# Patient Record
Sex: MALE | Race: WHITE | Employment: OTHER | URBAN - NONMETROPOLITAN AREA
[De-identification: names, ages, dates, MRNs, and addresses within clinical notes are randomized per-mention and may not be internally consistent; named-entity substitution may affect disease eponyms.]

---

## 2017-04-11 ENCOUNTER — OFFICE VISIT (OUTPATIENT)
Dept: CARDIOLOGY | Age: 67
End: 2017-04-11

## 2017-04-11 VITALS
BODY MASS INDEX: 28.39 KG/M2 | HEIGHT: 71 IN | DIASTOLIC BLOOD PRESSURE: 98 MMHG | WEIGHT: 202.8 LBS | HEART RATE: 44 BPM | SYSTOLIC BLOOD PRESSURE: 176 MMHG

## 2017-04-11 DIAGNOSIS — I10 ESSENTIAL HYPERTENSION: Primary | ICD-10-CM

## 2017-04-11 DIAGNOSIS — I48.0 PAROXYSMAL ATRIAL FIBRILLATION (HCC): ICD-10-CM

## 2017-04-11 DIAGNOSIS — E78.01 FAMILIAL HYPERCHOLESTEROLEMIA: ICD-10-CM

## 2017-04-11 PROCEDURE — 1036F TOBACCO NON-USER: CPT | Performed by: NUCLEAR MEDICINE

## 2017-04-11 PROCEDURE — 3017F COLORECTAL CA SCREEN DOC REV: CPT | Performed by: NUCLEAR MEDICINE

## 2017-04-11 PROCEDURE — G8427 DOCREV CUR MEDS BY ELIG CLIN: HCPCS | Performed by: NUCLEAR MEDICINE

## 2017-04-11 PROCEDURE — G8420 CALC BMI NORM PARAMETERS: HCPCS | Performed by: NUCLEAR MEDICINE

## 2017-04-11 PROCEDURE — 4040F PNEUMOC VAC/ADMIN/RCVD: CPT | Performed by: NUCLEAR MEDICINE

## 2017-04-11 PROCEDURE — 99213 OFFICE O/P EST LOW 20 MIN: CPT | Performed by: NUCLEAR MEDICINE

## 2017-04-11 PROCEDURE — 1123F ACP DISCUSS/DSCN MKR DOCD: CPT | Performed by: NUCLEAR MEDICINE

## 2017-04-11 RX ORDER — FLECAINIDE ACETATE 100 MG/1
TABLET ORAL
Qty: 135 TABLET | Refills: 4 | Status: SHIPPED | OUTPATIENT
Start: 2017-04-11 | End: 2018-07-02 | Stop reason: SDUPTHER

## 2017-04-11 RX ORDER — VALSARTAN AND HYDROCHLOROTHIAZIDE 320; 25 MG/1; MG/1
1 TABLET, FILM COATED ORAL DAILY
Qty: 90 TABLET | Refills: 4 | Status: SHIPPED | OUTPATIENT
Start: 2017-04-11 | End: 2021-01-05 | Stop reason: SDUPTHER

## 2017-12-20 RX ORDER — FLECAINIDE ACETATE 100 MG/1
TABLET ORAL
Qty: 135 TABLET | Refills: 2 | Status: SHIPPED | OUTPATIENT
Start: 2017-12-20 | End: 2018-02-13 | Stop reason: SDUPTHER

## 2018-02-13 ENCOUNTER — OFFICE VISIT (OUTPATIENT)
Dept: CARDIOLOGY CLINIC | Age: 68
End: 2018-02-13
Payer: MEDICARE

## 2018-02-13 VITALS
SYSTOLIC BLOOD PRESSURE: 146 MMHG | WEIGHT: 211 LBS | HEIGHT: 71 IN | DIASTOLIC BLOOD PRESSURE: 80 MMHG | BODY MASS INDEX: 29.54 KG/M2 | HEART RATE: 47 BPM

## 2018-02-13 DIAGNOSIS — R07.9 CHEST PAIN, UNSPECIFIED TYPE: Primary | ICD-10-CM

## 2018-02-13 DIAGNOSIS — I48.0 PAROXYSMAL ATRIAL FIBRILLATION (HCC): ICD-10-CM

## 2018-02-13 DIAGNOSIS — E78.01 FAMILIAL HYPERCHOLESTEROLEMIA: ICD-10-CM

## 2018-02-13 DIAGNOSIS — I10 ESSENTIAL HYPERTENSION: ICD-10-CM

## 2018-02-13 PROCEDURE — 99214 OFFICE O/P EST MOD 30 MIN: CPT | Performed by: NUCLEAR MEDICINE

## 2018-02-13 PROCEDURE — 93000 ELECTROCARDIOGRAM COMPLETE: CPT | Performed by: NUCLEAR MEDICINE

## 2018-02-13 NOTE — PROGRESS NOTES
Pt here for check up   Pt states he has been having some chest pain for the last 3 weeks   Gets it more when he bends over , does not get the chest pain on exertion   Also has some SOB when bending over   Denies dizziness

## 2018-02-13 NOTE — PROGRESS NOTES
[facility]  [address] [facility] [address]  Dept: [phone]  Dept Fax: [phone]  Loc: [phone]    Visit Date: 2/13/2018    Keegan Rogers is a 79 y.o. male who presents today for:  Chief Complaint   Patient presents with    Check-Up    Chest Pain    Hypertension    Atrial Fibrillation   having chest pain   In the middle of the chest   Worse with movement  Not exertional in nature  More positional than exertional   Some dyspnea   No dizziness  Known A fib that has been stable  BP is stable   No ER visits  No known CAD      HPI:  HPI  Past Medical History:   Diagnosis Date    Cancer ([address]     skin    GERD (gastroesophageal reflux disease)     Hyperlipidemia     Hypertension     Unspecified sleep apnea       Past Surgical History:   Procedure Laterality Date    ATRIAL ABLATION SURGERY  2000    unsure of physician    CHOLECYSTECTOMY  5-30-12    Dr. Arely Scales  2010     Dr. Hanna Richards  2007    Dr. Javon Frank  2010    Dr. John Kennedy     Family History   Problem Relation Age of Onset    Dementia Mother     High Blood Pressure Father     Heart Failure Father      Social History   Substance Use Topics    Smoking status: Never Smoker    Smokeless tobacco: Never Used    Alcohol use Yes      Comment: 2 glasses of wine per night until getting sick       Current Outpatient Prescriptions   Medication Sig Dispense Refill    aspirin 81 MG tablet Take 81 mg by mouth daily      Calcium Carb-Cholecalciferol (CALCIUM 1000 + D PO) Take by mouth      Fexofenadine HCl (MUCINEX ALLERGY PO) Take by mouth      flecainide (TAMBOCOR) 100 MG tablet Take 100mg in AM and 50in  tablet 4    rivaroxaban (XARELTO) 20 MG TABS tablet Take 1 tablet by mouth every 24 hours 90 tablet 4    valsartan-hydrochlorothiazide (DIOVAN HCT) 320-25 MG per tablet Take 1 tablet by mouth daily 90 tablet 4    Atorvastatin Calcium (LIPITOR PO) Take 20 mg by mouth daily.  FENOFIBRATE PO Take 54 mg by mouth daily.  atenolol (TENORMIN) 50 MG tablet Take 50 mg by mouth 2 times daily       guaiFENesin (MUCINEX) 600 MG SR tablet Take 600 mg by mouth every other day       Methylcellulose, Laxative, (CITRUCEL) 500 MG TABS Take 500 mg by mouth daily.  montelukast (SINGULAIR) 10 MG tablet Take 10 mg by mouth nightly.  therapeutic multivitamin-minerals (THERAGRAN-M) tablet Take 1 tablet by mouth daily.  omeprazole (PRILOSEC) 20 MG capsule Take 20 mg by mouth every other day        No current facility-administered medications for this visit. Allergies   Allergen Reactions    Celecoxib Hives    Pcn [Penicillins] Other (See Comments)     unsure    Sulfa Antibiotics Other (See Comments)     unsure     Health Maintenance   Topic Date Due    Hepatitis C screen  1950    DTaP/Tdap/Td vaccine (1 - Tdap) 10/14/1969    Diabetes screen  10/14/1990    Colon cancer screen colonoscopy  10/14/2000    Zostavax vaccine  10/14/2010    Potassium monitoring  07/02/2013    Creatinine monitoring  07/02/2013    Pneumococcal low/med risk (1 of 2 - PCV13) 10/14/2015    Flu vaccine (1) 09/01/2017    Lipid screen  11/12/2020       Subjective:  Review of Systems  General:   No fever, no chills, some fatigue or weight loss  Pulmonary:    some dyspnea, no wheezing  Cardiac:    Did have recent chest pain,   GI:     No nausea or vomiting, no abdominal pain  Neuro:     No dizziness or light headedness,   Musculoskeletal:  No recent active issues  Extremities:   No edema, good peripheral pulses      Objective:  Physical Exam  BP (!) 166/72   Pulse (!) 47   Ht 5' 11\" (1.803 m)   Wt 211 lb (95.7 kg)   BMI 29.43 kg/m²   General:   Well developed, well nourished  Lungs:    Clear to auscultation  Heart:    Normal S1 S2, Slight murmur.  no rubs, no gallops  Abdomen:   Soft, non tender, no organomegalies, positive bowel sounds  Extremities:   No edema, no cyanosis, good peripheral pulses  Neurological:   Awake, alert, oriented. No obvious focal deficits  Musculoskelatal:  No obvious deformities    Assessment:     1. Chest pain, unspecified type  EKG 12 Lead   2. Essential hypertension     3. Paroxysmal atrial fibrillation (HCC)     4. Familial hypercholesterolemia     chest pain  higher risk patient  Atypical chest pain for most part  Cant exclude angina but less likely   ECG in office was done today. I reviewed the ECG. No acute findings      Plan:  No Follow-up on file. Discussed options   Consider a stress test and echo   Less likely cardiac related   Mostly muscular in nature   Will watch and treat with NSAIDS   Need better BP control  Consider a CT of the chest   Continue risk factor modification and medical management  Thank you for allowing me to participate in the care of your patient. Please don't hesitate to contact me regarding any further issues related to the patient care    Orders Placed:  Orders Placed This Encounter   Procedures    EKG 12 Lead     Order Specific Question:   Reason for Exam?     Answer: Other       Medications Prescribed:  No orders of the defined types were placed in this encounter. Discussed use, benefit, and side effects of prescribed medications. All patient questions answered. Pt voiced understanding. Instructed to continue current medications, diet and exercise. Continue risk factor modification and medical management. Patient agreed with treatment plan. Follow up as directed.     Electronically signed by Daquan Paiz MD on 2/13/2018 at 3:32 PM

## 2018-04-17 ENCOUNTER — TELEPHONE (OUTPATIENT)
Dept: CARDIOLOGY CLINIC | Age: 68
End: 2018-04-17

## 2018-04-17 ENCOUNTER — OFFICE VISIT (OUTPATIENT)
Dept: CARDIOLOGY CLINIC | Age: 68
End: 2018-04-17
Payer: MEDICARE

## 2018-04-17 VITALS
SYSTOLIC BLOOD PRESSURE: 144 MMHG | HEIGHT: 71 IN | WEIGHT: 205 LBS | DIASTOLIC BLOOD PRESSURE: 82 MMHG | BODY MASS INDEX: 28.7 KG/M2 | HEART RATE: 42 BPM

## 2018-04-17 DIAGNOSIS — I48.20 CHRONIC ATRIAL FIBRILLATION (HCC): Primary | ICD-10-CM

## 2018-04-17 DIAGNOSIS — I10 ESSENTIAL HYPERTENSION: ICD-10-CM

## 2018-04-17 DIAGNOSIS — E78.01 FAMILIAL HYPERCHOLESTEROLEMIA: ICD-10-CM

## 2018-04-17 PROCEDURE — G8427 DOCREV CUR MEDS BY ELIG CLIN: HCPCS | Performed by: NUCLEAR MEDICINE

## 2018-04-17 PROCEDURE — 1123F ACP DISCUSS/DSCN MKR DOCD: CPT | Performed by: NUCLEAR MEDICINE

## 2018-04-17 PROCEDURE — G8419 CALC BMI OUT NRM PARAM NOF/U: HCPCS | Performed by: NUCLEAR MEDICINE

## 2018-04-17 PROCEDURE — 99213 OFFICE O/P EST LOW 20 MIN: CPT | Performed by: NUCLEAR MEDICINE

## 2018-04-17 PROCEDURE — 4040F PNEUMOC VAC/ADMIN/RCVD: CPT | Performed by: NUCLEAR MEDICINE

## 2018-04-17 PROCEDURE — 1036F TOBACCO NON-USER: CPT | Performed by: NUCLEAR MEDICINE

## 2018-04-17 PROCEDURE — 3017F COLORECTAL CA SCREEN DOC REV: CPT | Performed by: NUCLEAR MEDICINE

## 2018-07-02 RX ORDER — FLECAINIDE ACETATE 100 MG/1
TABLET ORAL
Qty: 135 TABLET | Refills: 4 | Status: SHIPPED | OUTPATIENT
Start: 2018-07-02 | End: 2018-11-27 | Stop reason: SDUPTHER

## 2018-11-27 ENCOUNTER — OFFICE VISIT (OUTPATIENT)
Dept: CARDIOLOGY CLINIC | Age: 68
End: 2018-11-27
Payer: MEDICARE

## 2018-11-27 VITALS
WEIGHT: 203 LBS | HEIGHT: 71 IN | DIASTOLIC BLOOD PRESSURE: 80 MMHG | HEART RATE: 62 BPM | BODY MASS INDEX: 28.42 KG/M2 | SYSTOLIC BLOOD PRESSURE: 160 MMHG

## 2018-11-27 DIAGNOSIS — I48.0 PAROXYSMAL ATRIAL FIBRILLATION (HCC): Primary | ICD-10-CM

## 2018-11-27 DIAGNOSIS — I10 ESSENTIAL HYPERTENSION: ICD-10-CM

## 2018-11-27 DIAGNOSIS — E78.01 FAMILIAL HYPERCHOLESTEROLEMIA: ICD-10-CM

## 2018-11-27 PROCEDURE — 1123F ACP DISCUSS/DSCN MKR DOCD: CPT | Performed by: NUCLEAR MEDICINE

## 2018-11-27 PROCEDURE — 3017F COLORECTAL CA SCREEN DOC REV: CPT | Performed by: NUCLEAR MEDICINE

## 2018-11-27 PROCEDURE — G8427 DOCREV CUR MEDS BY ELIG CLIN: HCPCS | Performed by: NUCLEAR MEDICINE

## 2018-11-27 PROCEDURE — 1101F PT FALLS ASSESS-DOCD LE1/YR: CPT | Performed by: NUCLEAR MEDICINE

## 2018-11-27 PROCEDURE — 99213 OFFICE O/P EST LOW 20 MIN: CPT | Performed by: NUCLEAR MEDICINE

## 2018-11-27 PROCEDURE — G8419 CALC BMI OUT NRM PARAM NOF/U: HCPCS | Performed by: NUCLEAR MEDICINE

## 2018-11-27 PROCEDURE — 4040F PNEUMOC VAC/ADMIN/RCVD: CPT | Performed by: NUCLEAR MEDICINE

## 2018-11-27 PROCEDURE — G8484 FLU IMMUNIZE NO ADMIN: HCPCS | Performed by: NUCLEAR MEDICINE

## 2018-11-27 PROCEDURE — 1036F TOBACCO NON-USER: CPT | Performed by: NUCLEAR MEDICINE

## 2018-11-27 RX ORDER — FLECAINIDE ACETATE 100 MG/1
TABLET ORAL
Qty: 135 TABLET | Refills: 3 | Status: SHIPPED | OUTPATIENT
Start: 2018-11-27 | End: 2019-11-11 | Stop reason: SDUPTHER

## 2018-11-27 NOTE — PROGRESS NOTES
by mouth daily.  atenolol (TENORMIN) 50 MG tablet Take 50 mg by mouth 2 times daily       guaiFENesin (MUCINEX) 600 MG SR tablet Take 600 mg by mouth every other day       Methylcellulose, Laxative, (CITRUCEL) 500 MG TABS Take 500 mg by mouth daily.  montelukast (SINGULAIR) 10 MG tablet Take 10 mg by mouth nightly.  therapeutic multivitamin-minerals (THERAGRAN-M) tablet Take 1 tablet by mouth daily.  omeprazole (PRILOSEC) 20 MG capsule Take 20 mg by mouth every other day        No current facility-administered medications for this visit. Allergies   Allergen Reactions    Celecoxib Hives    Pcn [Penicillins] Other (See Comments)     unsure    Sulfa Antibiotics Other (See Comments)     unsure     Health Maintenance   Topic Date Due    Hepatitis C screen  1950    DTaP/Tdap/Td vaccine (1 - Tdap) 10/14/1969    Diabetes screen  10/14/1990    Shingles Vaccine (1 of 2 - 2 Dose Series) 10/14/2000    Colon cancer screen colonoscopy  10/14/2000    Pneumococcal low/med risk (1 of 2 - PCV13) 10/14/2015    Flu vaccine (1) 09/01/2018    Lipid screen  11/12/2020       Subjective:  Review of Systems  General:   No fever, no chills, No fatigue or weight loss  Pulmonary:    No dyspnea, no wheezing  Cardiac:    Denies recent chest pain,   GI:     No nausea or vomiting, no abdominal pain  Neuro:    No dizziness or light headedness,   Musculoskeletal:  No recent active issues  Extremities:   No edema, good peripheral pulses      Objective:  Physical Exam  BP (!) 150/80   Pulse 62   Ht 5' 11\" (1.803 m)   Wt 203 lb (92.1 kg)   BMI 28.31 kg/m²   General:   Well developed, well nourished  Lungs:   Clear to auscultation  Heart:    Normal S1 S2, Slight murmur. no rubs, no gallops  Abdomen:   Soft, non tender, no organomegalies, positive bowel sounds  Extremities:   No edema, no cyanosis, good peripheral pulses  Neurological:   Awake, alert, oriented.  No obvious focal

## 2019-04-04 ENCOUNTER — HOSPITAL ENCOUNTER (OUTPATIENT)
Dept: CT IMAGING | Age: 69
Discharge: HOME | End: 2019-04-04
Payer: MEDICARE

## 2019-04-04 ENCOUNTER — TELEPHONE (OUTPATIENT)
Dept: CARDIOTHORACIC SURGERY | Age: 69
End: 2019-04-04

## 2019-04-04 ENCOUNTER — TELEPHONE (OUTPATIENT)
Dept: CARDIOLOGY CLINIC | Age: 69
End: 2019-04-04

## 2019-04-04 DIAGNOSIS — I71.40 ABDOMINAL AORTIC ANEURYSM (AAA) WITHOUT RUPTURE: Primary | ICD-10-CM

## 2019-04-04 DIAGNOSIS — Z00.6 EXAMINATION FOR NORMAL COMPARISON FOR CLINICAL RESEARCH: ICD-10-CM

## 2019-04-04 DIAGNOSIS — I71.40 ABDOMINAL AORTIC ANEURYSM (AAA) WITHOUT RUPTURE: ICD-10-CM

## 2019-04-04 DIAGNOSIS — I67.1 SACCULAR ANEURYSM: ICD-10-CM

## 2019-04-04 LAB — POC CREATININE WHOLE BLOOD: 1.2 MG/DL (ref 0.5–1.2)

## 2019-04-04 PROCEDURE — 6360000004 HC RX CONTRAST MEDICATION: Performed by: THORACIC SURGERY (CARDIOTHORACIC VASCULAR SURGERY)

## 2019-04-04 PROCEDURE — 82565 ASSAY OF CREATININE: CPT

## 2019-04-04 PROCEDURE — 75635 CT ANGIO ABDOMINAL ARTERIES: CPT

## 2019-04-04 PROCEDURE — 3209999900 CT COMPARISON OF OUTSIDE FILMS

## 2019-04-04 RX ADMIN — IOPAMIDOL 110 ML: 755 INJECTION, SOLUTION INTRAVENOUS at 13:07

## 2019-04-04 NOTE — TELEPHONE ENCOUNTER
Pre op Risk Assessment    Procedure Colonoscopy  Physician Dr. Rekha Flores  Date of surgery/procedure TBD    Last OV 11/27/18  Last Stress Not in Epic  Last Echo Not in Epic  Last Cath Not in Epic  Last Stent Not in Epic  Is patient on blood thinners Xarelto  Hold Meds/how many days 1 day

## 2019-04-04 NOTE — TELEPHONE ENCOUNTER
Pt called into office stating he was seen in the ER in [facility] last night and he needs an appt with Dr. Callie Clancy. Spoke to  about this pt this morning and was expecting his call. States he can see the patient in office tomorrow at 1:30 or 2:00. Pt instructed and will be here tomorrow for new patient visit.

## 2019-04-05 ENCOUNTER — OFFICE VISIT (OUTPATIENT)
Dept: CARDIOTHORACIC SURGERY | Age: 69
End: 2019-04-05
Payer: MEDICARE

## 2019-04-05 VITALS
SYSTOLIC BLOOD PRESSURE: 100 MMHG | DIASTOLIC BLOOD PRESSURE: 67 MMHG | HEART RATE: 50 BPM | BODY MASS INDEX: 28.36 KG/M2 | WEIGHT: 202.6 LBS | HEIGHT: 71 IN

## 2019-04-05 DIAGNOSIS — I71.40 ABDOMINAL AORTIC ANEURYSM (AAA) 3.0 CM TO 5.5 CM IN DIAMETER IN MALE: Primary | ICD-10-CM

## 2019-04-05 PROCEDURE — 99204 OFFICE O/P NEW MOD 45 MIN: CPT | Performed by: THORACIC SURGERY (CARDIOTHORACIC VASCULAR SURGERY)

## 2019-04-05 PROCEDURE — G8427 DOCREV CUR MEDS BY ELIG CLIN: HCPCS | Performed by: THORACIC SURGERY (CARDIOTHORACIC VASCULAR SURGERY)

## 2019-04-05 PROCEDURE — 4040F PNEUMOC VAC/ADMIN/RCVD: CPT | Performed by: THORACIC SURGERY (CARDIOTHORACIC VASCULAR SURGERY)

## 2019-04-05 PROCEDURE — 3017F COLORECTAL CA SCREEN DOC REV: CPT | Performed by: THORACIC SURGERY (CARDIOTHORACIC VASCULAR SURGERY)

## 2019-04-05 PROCEDURE — G8419 CALC BMI OUT NRM PARAM NOF/U: HCPCS | Performed by: THORACIC SURGERY (CARDIOTHORACIC VASCULAR SURGERY)

## 2019-04-05 PROCEDURE — 1036F TOBACCO NON-USER: CPT | Performed by: THORACIC SURGERY (CARDIOTHORACIC VASCULAR SURGERY)

## 2019-04-05 PROCEDURE — 1123F ACP DISCUSS/DSCN MKR DOCD: CPT | Performed by: THORACIC SURGERY (CARDIOTHORACIC VASCULAR SURGERY)

## 2019-04-05 RX ORDER — CYCLOBENZAPRINE HCL 10 MG
10 TABLET ORAL 3 TIMES DAILY PRN
COMMUNITY
End: 2019-11-26

## 2019-04-05 ASSESSMENT — ENCOUNTER SYMPTOMS
ALLERGIC/IMMUNOLOGIC NEGATIVE: 1
RESPIRATORY NEGATIVE: 1
EYES NEGATIVE: 1
GASTROINTESTINAL NEGATIVE: 1

## 2019-04-05 NOTE — PROGRESS NOTES
[address] [place] [address]  Dept: [phone]  Dept Fax: [phone]: [phone]    Visit Date: 4/5/2019    Mr. Jinny Rodriguez is a 76 y.o.male  who presented for:  Chief Complaint   Patient presents with    New Patient     ER referral, Saccular aneurysm, AAA        HPI:   HPI : 77 y/o male hx low back pain went to ER CTA abdomen pelvis done found 3 cm widest diameter infra renal saccular AAA. CTA runoff feet done yesterday, no popliteal or visceral aneurysms, there is a right posterior sacculation of the mid infrarenal aorta that measures 3.1 cm from its edge to the opposite aortic wall. Native aorta 2.6 cm AP by renals. Wide open arteries bilaterally with 3 vessel below knee runoff both legs and palpable DP and PT pulses bilaterally. Has small bilateral inguinal hernias, fat ,2 liver cysts, cholecystectomy and colonic diverticulosis, I informed Mr. Jinny Rodriguez about these too. Since aneurysm saccular will repeat CT Abd Pelvis without contrast in 1 year. Mr Jinny Rodriguez and wife agreeable. Current Outpatient Medications:     cyclobenzaprine (FLEXERIL) 10 MG tablet, Take 10 mg by mouth 3 times daily as needed for Muscle spasms, Disp: , Rfl:     flecainide (TAMBOCOR) 100 MG tablet, Take 100mg in AM and 50in PM, Disp: 135 tablet, Rfl: 3    rivaroxaban (XARELTO) 20 MG TABS tablet, Take 1 tablet by mouth every 24 hours, Disp: 90 tablet, Rfl: 3    aspirin 81 MG tablet, Take 81 mg by mouth daily, Disp: , Rfl:     Calcium Carb-Cholecalciferol (CALCIUM 1000 + D PO), Take by mouth, Disp: , Rfl:     Fexofenadine HCl (MUCINEX ALLERGY PO), Take by mouth, Disp: , Rfl:     valsartan-hydrochlorothiazide (DIOVAN HCT) 320-25 MG per tablet, Take 1 tablet by mouth daily, Disp: 90 tablet, Rfl: 4    Atorvastatin Calcium (LIPITOR PO), Take 20 mg by mouth daily. , Disp: , Rfl:     FENOFIBRATE PO, Take 54 mg by mouth daily. , Disp: , Rfl:     atenolol (TENORMIN) 50 MG tablet, Take 50 mg by mouth 2 times daily , Disp: , Rfl:     guaiFENesin (MUCINEX) 600 MG SR tablet, Take 600 mg by mouth every other day , Disp: , Rfl:     Methylcellulose, Laxative, (CITRUCEL) 500 MG TABS, Take 500 mg by mouth daily. , Disp: , Rfl:     montelukast (SINGULAIR) 10 MG tablet, Take 10 mg by mouth nightly.  , Disp: , Rfl:     therapeutic multivitamin-minerals (THERAGRAN-M) tablet, Take 1 tablet by mouth daily. , Disp: , Rfl:     omeprazole (PRILOSEC) 20 MG capsule, Take 20 mg by mouth every other day , Disp: , Rfl:     Allergies   Allergen Reactions    Celecoxib Hives    Pcn [Penicillins] Other (See Comments)     unsure    Sulfa Antibiotics Other (See Comments)     unsure       Past Medical History  Niranjan Sevilla  has a past medical history of Cancer ([facility] [address] GERD (gastroesophageal reflux disease), Hyperlipidemia, Hypertension, and Unspecified sleep apnea. Social History  Niranjan Sevilla  reports that he has never smoked. He has never used smokeless tobacco. He reports that he drinks alcohol. He reports that he does not use drugs. Family History  Zach family history includes Dementia in his mother; Heart Failure in his father; High Blood Pressure in his father. There is no family history of bicuspid aortic valve, aneurysms, heart transplant, pacemakers, defibrillators, or sudden cardiac death. Past Surgical History   Past Surgical History:   Procedure Laterality Date    ATRIAL ABLATION SURGERY  2000    unsure of physician    CHOLECYSTECTOMY  5-30-12    Dr. Nicholas Pemberton  2010     Dr. Roshan Monteiro  2007    Dr. Jonathan Baker  2010    Dr. Lalitha Lama       Subjective:     Review of Systems   Constitutional: Negative. HENT: Negative. Eyes: Negative. Respiratory: Negative. Cardiovascular: Negative. Gastrointestinal: Negative. Endocrine: Negative. Genitourinary: Negative. Musculoskeletal: Negative. Skin: Negative. Allergic/Immunologic: Negative. Neurological: Negative. Hematological: Negative. Psychiatric/Behavioral: Negative. Objective:     /67 (Site: Left Upper Arm, Position: Sitting, Cuff Size: Medium Adult)   Pulse 50   Ht 5' 11\" (1.803 m)   Wt 202 lb 9.6 oz (91.9 kg)   BMI 28.26 kg/m²     Wt Readings from Last 3 Encounters:   04/05/19 202 lb 9.6 oz (91.9 kg)   11/27/18 203 lb (92.1 kg)   04/17/18 205 lb (93 kg)     BP Readings from Last 3 Encounters:   04/05/19 100/67   11/27/18 (!) 160/80   04/17/18 (!) 144/82       Physical Exam   Constitutional: He is oriented to person, place, and time. He appears well-developed and well-nourished. No distress. HENT:   Head: Normocephalic and atraumatic. Right Ear: External ear normal.   Left Ear: External ear normal.   Nose: Nose normal.   Mouth/Throat: Oropharynx is clear and moist. No oropharyngeal exudate. Eyes: Pupils are equal, round, and reactive to light. Conjunctivae and EOM are normal. Right eye exhibits no discharge. Left eye exhibits no discharge. No scleral icterus. Neck: Normal range of motion. Neck supple. No JVD present. No tracheal deviation present. No thyromegaly present. Cardiovascular: Normal rate, regular rhythm and normal heart sounds. Exam reveals no gallop and no friction rub. No murmur heard. Pulmonary/Chest: Effort normal and breath sounds normal. No stridor. No respiratory distress. He has no wheezes. He has no rales. He exhibits no tenderness. Abdominal: Soft. Bowel sounds are normal. He exhibits no distension and no mass. There is no tenderness. There is no rebound and no guarding. Musculoskeletal: Normal range of motion. He exhibits no edema, tenderness or deformity. Lymphadenopathy:     He has no cervical adenopathy. Neurological: He is alert and oriented to person, place, and time. He has normal reflexes. He displays normal reflexes.  No cranial nerve deficit or sensory deficit. He exhibits normal muscle tone. Coordination normal.   Skin: Skin is warm and dry. Capillary refill takes less than 2 seconds. No rash noted. He is not diaphoretic. No erythema. No pallor. Psychiatric: He has a normal mood and affect. His behavior is normal. Judgment and thought content normal.       Lab Results   Component Value Date    WBC 4.6 09/05/2012    RBC 4.67 09/05/2012    RBC 4.68 06/06/2012    HGB 15.2 09/05/2012    HCT 43.0 09/05/2012    MCV 92.2 09/05/2012    MCH 32.5 09/05/2012    MCHC 35.3 09/05/2012    RDW 14.7 09/05/2012     09/05/2012    MPV 9.1 09/05/2012       Lab Results   Component Value Date     07/02/2012    K 4.8 07/02/2012     07/02/2012    CO2 28 07/02/2012    BUN 20 07/02/2012    LABALBU 3.7 09/05/2012    LABALBU 3.8 06/06/2012    CREATININE 1.1 07/02/2012    CALCIUM 9.1 07/02/2012    LABGLOM 68 07/02/2012    GLUCOSE 105 07/02/2012    GLUCOSE 118 06/06/2012       Lab Results   Component Value Date    ALKPHOS 49 09/05/2012    ALT 27 09/05/2012    AST 24 09/05/2012    PROT 7.0 09/05/2012    BILITOT 1.0 09/05/2012    BILIDIR 0.3 09/05/2012    LABALBU 3.7 09/05/2012    LABALBU 3.8 06/06/2012       Lab Results   Component Value Date    MG 2.1 05/31/2012       Lab Results   Component Value Date    INR 1.16 (H) 05/26/2012         No results found for: LABA1C    No results found for: TRIG, HDL, LDLCALC, LDLDIRECT, LABVLDL    No results found for: TSH      Testing Reviewed:      I have individually reviewed the below cardiac tests    EKG:     ECHO:No results found for this or any previous visit. CATH:     Assessment/Plan     1. Abdominal aortic aneurysm (AAA) 3.0 cm to 5.5 cm in diameter in [facility])      Orders Placed This Encounter   Procedures    CT ABDOMEN PELVIS WO CONTRAST Additional Contrast? None     No orders of the defined types were placed in this encounter.             Return in about 1 year (around 4/5/2020) for CT abdomen pelvis.       Electronically signed by Lo Antonio MD   4/5/2019 at 2:12 PM

## 2019-04-05 NOTE — PATIENT INSTRUCTIONS
CT ABDOMEN PELVIS WITH CONTRAST -     PREPS:     Arrive 90 minutes prior to the test.  No food or drink 4 hours prior to the test.   Bring a list of current medications. Please report to Main Radiology 1st floor of [facility].  Patients scheduled on Saturday must report to [facility] for testing.

## 2019-04-08 ENCOUNTER — TELEPHONE (OUTPATIENT)
Dept: CARDIOTHORACIC SURGERY | Age: 69
End: 2019-04-08

## 2019-05-31 ENCOUNTER — HOSPITAL ENCOUNTER (OUTPATIENT)
Dept: CT IMAGING | Age: 69
Discharge: HOME | End: 2019-05-31
Payer: MEDICARE

## 2019-05-31 DIAGNOSIS — Z00.6 EXAMINATION FOR NORMAL COMPARISON FOR CLINICAL RESEARCH: ICD-10-CM

## 2019-11-11 RX ORDER — RIVAROXABAN 20 MG/1
TABLET, FILM COATED ORAL
Qty: 90 TABLET | Refills: 0 | Status: SHIPPED | OUTPATIENT
Start: 2019-11-11 | End: 2019-11-26 | Stop reason: SDUPTHER

## 2019-11-11 RX ORDER — FLECAINIDE ACETATE 100 MG/1
TABLET ORAL
Qty: 135 TABLET | Refills: 3 | Status: SHIPPED | OUTPATIENT
Start: 2019-11-11 | End: 2019-11-26 | Stop reason: SDUPTHER

## 2019-11-26 ENCOUNTER — OFFICE VISIT (OUTPATIENT)
Dept: CARDIOLOGY CLINIC | Age: 69
End: 2019-11-26
Payer: MEDICARE

## 2019-11-26 VITALS
WEIGHT: 196.21 LBS | SYSTOLIC BLOOD PRESSURE: 160 MMHG | BODY MASS INDEX: 28.09 KG/M2 | HEIGHT: 70 IN | HEART RATE: 48 BPM | DIASTOLIC BLOOD PRESSURE: 86 MMHG

## 2019-11-26 DIAGNOSIS — I10 ESSENTIAL HYPERTENSION: ICD-10-CM

## 2019-11-26 DIAGNOSIS — I48.0 PAROXYSMAL ATRIAL FIBRILLATION (HCC): Primary | ICD-10-CM

## 2019-11-26 DIAGNOSIS — E78.01 FAMILIAL HYPERCHOLESTEROLEMIA: ICD-10-CM

## 2019-11-26 PROCEDURE — G8427 DOCREV CUR MEDS BY ELIG CLIN: HCPCS | Performed by: NUCLEAR MEDICINE

## 2019-11-26 PROCEDURE — G8417 CALC BMI ABV UP PARAM F/U: HCPCS | Performed by: NUCLEAR MEDICINE

## 2019-11-26 PROCEDURE — 4040F PNEUMOC VAC/ADMIN/RCVD: CPT | Performed by: NUCLEAR MEDICINE

## 2019-11-26 PROCEDURE — 99214 OFFICE O/P EST MOD 30 MIN: CPT | Performed by: NUCLEAR MEDICINE

## 2019-11-26 PROCEDURE — 1123F ACP DISCUSS/DSCN MKR DOCD: CPT | Performed by: NUCLEAR MEDICINE

## 2019-11-26 PROCEDURE — 3017F COLORECTAL CA SCREEN DOC REV: CPT | Performed by: NUCLEAR MEDICINE

## 2019-11-26 PROCEDURE — 1036F TOBACCO NON-USER: CPT | Performed by: NUCLEAR MEDICINE

## 2019-11-26 PROCEDURE — G8484 FLU IMMUNIZE NO ADMIN: HCPCS | Performed by: NUCLEAR MEDICINE

## 2019-11-26 RX ORDER — FLECAINIDE ACETATE 100 MG/1
TABLET ORAL
Qty: 135 TABLET | Refills: 3 | Status: SHIPPED | OUTPATIENT
Start: 2019-11-26 | End: 2020-05-26 | Stop reason: SDUPTHER

## 2019-11-26 RX ORDER — VITAMIN B COMPLEX
1 CAPSULE ORAL DAILY
COMMUNITY
End: 2022-08-22

## 2020-01-10 ENCOUNTER — TELEPHONE (OUTPATIENT)
Dept: CARDIOTHORACIC SURGERY | Age: 70
End: 2020-01-10

## 2020-01-10 NOTE — TELEPHONE ENCOUNTER
Patient called into office stating he was a previous Dr. Henriquez Listen patient. States does his CT of abd/pelvis need to be with contrast or w/o? He states he wants to be sure he gets the correct test done.

## 2020-05-22 ENCOUNTER — TELEPHONE (OUTPATIENT)
Dept: CARDIOLOGY CLINIC | Age: 70
End: 2020-05-22

## 2020-05-26 ENCOUNTER — TELEPHONE (OUTPATIENT)
Dept: CARDIOLOGY CLINIC | Age: 70
End: 2020-05-26

## 2020-05-26 ENCOUNTER — VIRTUAL VISIT (OUTPATIENT)
Dept: CARDIOLOGY CLINIC | Age: 70
End: 2020-05-26
Payer: MEDICARE

## 2020-05-26 PROCEDURE — 1123F ACP DISCUSS/DSCN MKR DOCD: CPT | Performed by: NUCLEAR MEDICINE

## 2020-05-26 PROCEDURE — 99213 OFFICE O/P EST LOW 20 MIN: CPT | Performed by: NUCLEAR MEDICINE

## 2020-05-26 PROCEDURE — 4040F PNEUMOC VAC/ADMIN/RCVD: CPT | Performed by: NUCLEAR MEDICINE

## 2020-05-26 PROCEDURE — 3017F COLORECTAL CA SCREEN DOC REV: CPT | Performed by: NUCLEAR MEDICINE

## 2020-05-26 PROCEDURE — G8428 CUR MEDS NOT DOCUMENT: HCPCS | Performed by: NUCLEAR MEDICINE

## 2020-05-26 RX ORDER — FLECAINIDE ACETATE 100 MG/1
TABLET ORAL
Qty: 135 TABLET | Refills: 0 | Status: SHIPPED | OUTPATIENT
Start: 2020-05-26 | End: 2020-08-13

## 2020-05-26 NOTE — TELEPHONE ENCOUNTER
Post VV:    8 month follow up appt scheduled for 1-5-21 at 11:00 with Dr. Anup Bardales in [facility]. AVS mailed and refills pended for Dr. Anup Bardales.

## 2020-05-26 NOTE — PROGRESS NOTES
[address] [facility],[place] [address] [address] [facility] [address]  [facility] [address]  Dept: [phone]  Dept Fax: [phone]  Loc: [phone]    Visit Date: 5/26/2020    Lio Hayes is a 71 y.o. male who presents todayfor:  Chief Complaint   Patient presents with    Atrial Fibrillation    Hypertension     TELEHEALTH EVALUATION -- Audio/Visual (During TTNCC-57 public health emergency)    Lio Hayes is a 71 y.o. male who is seen via Virtual Video  Doxy  visit. Lio Hayes was at home. Provider was present at Cardiology clinic. Cardiology staff assisted.   A fib is stable  No new events  No chest pain  No changes in breathing  BP is stable higher at times   151 / 76   No dizziness  No n syncope     HPI:  HPI  Past Medical History:   Diagnosis Date    Cancer ([facility] [address]     skin    GERD (gastroesophageal reflux disease)     Hyperlipidemia     Hypertension     Unspecified sleep apnea       Past Surgical History:   Procedure Laterality Date    ATRIAL ABLATION SURGERY  2000    unsure of physician    CHOLECYSTECTOMY  5-30-12    Dr. Esther Long  2010     Dr. Ronda Tucker  2007    Dr. Xiomy Ramos  2010    Dr. Octavio Marshall     Family History   Problem Relation Age of Onset    Dementia Mother     High Blood Pressure Father     Heart Failure Father      Social History     Tobacco Use    Smoking status: Never Smoker    Smokeless tobacco: Never Used   Substance Use Topics    Alcohol use: Yes     Comment: 2 glasses of wine per night until getting sick       Current Outpatient Medications   Medication Sig Dispense Refill    b complex vitamins capsule Take 1 capsule by mouth daily      rivaroxaban (XARELTO) 20 MG TABS tablet TAKE 1 TABLET EVERY 24     HOURS 90 tablet 3    flecainide (TAMBOCOR) 100 MG tablet Take 100mg in AM and 50in  tablet 3    aspirin 81 MG tablet Take 81 mg by mouth daily      Calcium Carb-Cholecalciferol (CALCIUM 1000 + D PO) Take by mouth      Fexofenadine HCl (MUCINEX ALLERGY PO) Take by mouth      valsartan-hydrochlorothiazide (DIOVAN HCT) 320-25 MG per tablet Take 1 tablet by mouth daily 90 tablet 4    Atorvastatin Calcium (LIPITOR PO) Take 20 mg by mouth daily.  FENOFIBRATE PO Take 54 mg by mouth daily.  atenolol (TENORMIN) 50 MG tablet Take 50 mg by mouth See Admin Instructions 50 in am and 25 in pm      guaiFENesin (MUCINEX) 600 MG SR tablet Take 600 mg by mouth every other day       Methylcellulose, Laxative, (CITRUCEL) 500 MG TABS Take 500 mg by mouth daily.  montelukast (SINGULAIR) 10 MG tablet Take 10 mg by mouth nightly.  therapeutic multivitamin-minerals (THERAGRAN-M) tablet Take 1 tablet by mouth daily.  omeprazole (PRILOSEC) 20 MG capsule Take 20 mg by mouth every other day        No current facility-administered medications for this visit.       Allergies   Allergen Reactions    Celecoxib Hives    Ciprofloxacin     Flagyl [Metronidazole]     Sulfa Antibiotics Other (See Comments)     unsure     Health Maintenance   Topic Date Due    Hepatitis C screen  1950    DTaP/Tdap/Td vaccine (1 - Tdap) 10/14/1969    Diabetes screen  10/14/1990    Colon cancer screen colonoscopy  10/14/2000    Potassium monitoring  07/02/2013    Creatinine monitoring  07/02/2013    Shingles Vaccine (2 of 3) 09/26/2014    Pneumococcal 65+ years Vaccine (2 of 2 - PPSV23) 10/14/2015    Lipid screen  11/12/2016    Annual Wellness Visit (AWV)  06/23/2019    Flu vaccine (Season Ended) 09/01/2020    Hepatitis A vaccine  Aged Out    Hepatitis B vaccine  Aged Out    Hib vaccine  Aged Out    Meningococcal (ACWY) vaccine  Aged Out       Subjective:  Review of Systems  General:   No fever, no chills, No fatigue or weight loss  Pulmonary:    No dyspnea, no wheezing  Cardiac:    Denies recent chest pain,   GI:     No nausea or vomiting, no abdominal

## 2020-08-06 ENCOUNTER — TELEPHONE (OUTPATIENT)
Dept: CARDIOTHORACIC SURGERY | Age: 70
End: 2020-08-06

## 2020-08-06 NOTE — TELEPHONE ENCOUNTER
CT scan called. Patient is scheduled for CT Abd/Pelvis tomorrow 8/7/20. They state order for AAA is typically CTA. Order changed and CT scan notified.

## 2020-08-07 ENCOUNTER — HOSPITAL ENCOUNTER (EMERGENCY)
Age: 70
Discharge: HOME | End: 2020-08-07
Attending: EMERGENCY MEDICINE
Payer: MEDICARE

## 2020-08-07 ENCOUNTER — HOSPITAL ENCOUNTER (OUTPATIENT)
Dept: CT IMAGING | Age: 70
Discharge: HOME | End: 2020-08-07
Payer: MEDICARE

## 2020-08-07 VITALS
HEART RATE: 48 BPM | RESPIRATION RATE: 12 BRPM | BODY MASS INDEX: 27.92 KG/M2 | WEIGHT: 195 LBS | SYSTOLIC BLOOD PRESSURE: 150 MMHG | HEIGHT: 70 IN | DIASTOLIC BLOOD PRESSURE: 69 MMHG | OXYGEN SATURATION: 96 % | TEMPERATURE: 97.9 F

## 2020-08-07 LAB
ALBUMIN SERPL-MCNC: 4.4 G/DL (ref 3.5–5.1)
ALP BLD-CCNC: 40 U/L (ref 38–126)
ALT SERPL-CCNC: 31 U/L (ref 11–66)
ANION GAP SERPL CALCULATED.3IONS-SCNC: 10 MEQ/L (ref 8–16)
AST SERPL-CCNC: 29 U/L (ref 5–40)
BASOPHILS # BLD: 1 %
BASOPHILS ABSOLUTE: 0.1 THOU/MM3 (ref 0–0.1)
BILIRUB SERPL-MCNC: 1.2 MG/DL (ref 0.3–1.2)
BILIRUBIN URINE: NEGATIVE
BLOOD, URINE: NEGATIVE
BUN BLDV-MCNC: 19 MG/DL (ref 7–22)
CALCIUM SERPL-MCNC: 9.6 MG/DL (ref 8.5–10.5)
CHARACTER, URINE: CLEAR
CHLORIDE BLD-SCNC: 99 MEQ/L (ref 98–111)
CO2: 27 MEQ/L (ref 23–33)
COLOR: YELLOW
CREAT SERPL-MCNC: 1 MG/DL (ref 0.4–1.2)
EOSINOPHIL # BLD: 1 %
EOSINOPHILS ABSOLUTE: 0.1 THOU/MM3 (ref 0–0.4)
ERYTHROCYTE [DISTWIDTH] IN BLOOD BY AUTOMATED COUNT: 13.2 % (ref 11.5–14.5)
ERYTHROCYTE [DISTWIDTH] IN BLOOD BY AUTOMATED COUNT: 45.8 FL (ref 35–45)
GFR SERPL CREATININE-BSD FRML MDRD: 74 ML/MIN/1.73M2
GLUCOSE BLD-MCNC: 101 MG/DL (ref 70–108)
GLUCOSE URINE: NEGATIVE MG/DL
HCT VFR BLD CALC: 46 % (ref 42–52)
HEMOGLOBIN: 16.4 GM/DL (ref 14–18)
IMMATURE GRANS (ABS): 0.01 THOU/MM3 (ref 0–0.07)
IMMATURE GRANULOCYTES: 0.2 %
INR BLD: 2.01 (ref 0.85–1.13)
KETONES, URINE: NEGATIVE
LACTIC ACID: 1.3 MMOL/L (ref 0.5–2.2)
LEUKOCYTE ESTERASE, URINE: NEGATIVE
LYMPHOCYTES # BLD: 21.1 %
LYMPHOCYTES ABSOLUTE: 1.1 THOU/MM3 (ref 1–4.8)
MCH RBC QN AUTO: 33.8 PG (ref 26–33)
MCHC RBC AUTO-ENTMCNC: 35.7 GM/DL (ref 32.2–35.5)
MCV RBC AUTO: 94.8 FL (ref 80–94)
MONOCYTES # BLD: 9.4 %
MONOCYTES ABSOLUTE: 0.5 THOU/MM3 (ref 0.4–1.3)
NITRITE, URINE: NEGATIVE
NUCLEATED RED BLOOD CELLS: 0 /100 WBC
OSMOLALITY CALCULATION: 274.4 MOSMOL/KG (ref 275–300)
PH UA: 5.5 (ref 5–9)
PLATELET # BLD: 188 THOU/MM3 (ref 130–400)
PMV BLD AUTO: 10.5 FL (ref 9.4–12.4)
POC CREATININE WHOLE BLOOD: 1.2 MG/DL (ref 0.5–1.2)
POTASSIUM SERPL-SCNC: 4.1 MEQ/L (ref 3.5–5.2)
PROTEIN UA: NEGATIVE
RBC # BLD: 4.85 MILL/MM3 (ref 4.7–6.1)
SEG NEUTROPHILS: 67.3 %
SEGMENTED NEUTROPHILS ABSOLUTE COUNT: 3.4 THOU/MM3 (ref 1.8–7.7)
SODIUM BLD-SCNC: 136 MEQ/L (ref 135–145)
SPECIFIC GRAVITY, URINE: > 1.03 (ref 1–1.03)
TOTAL PROTEIN: 6.8 G/DL (ref 6.1–8)
UROBILINOGEN, URINE: 0.2 EU/DL (ref 0–1)
WBC # BLD: 5 THOU/MM3 (ref 4.8–10.8)

## 2020-08-07 PROCEDURE — 99284 EMERGENCY DEPT VISIT MOD MDM: CPT

## 2020-08-07 PROCEDURE — 87040 BLOOD CULTURE FOR BACTERIA: CPT

## 2020-08-07 PROCEDURE — 74174 CTA ABD&PLVS W/CONTRAST: CPT

## 2020-08-07 PROCEDURE — 83605 ASSAY OF LACTIC ACID: CPT

## 2020-08-07 PROCEDURE — 85025 COMPLETE CBC W/AUTO DIFF WBC: CPT

## 2020-08-07 PROCEDURE — 99283 EMERGENCY DEPT VISIT LOW MDM: CPT

## 2020-08-07 PROCEDURE — 81003 URINALYSIS AUTO W/O SCOPE: CPT

## 2020-08-07 PROCEDURE — 80053 COMPREHEN METABOLIC PANEL: CPT

## 2020-08-07 PROCEDURE — 85610 PROTHROMBIN TIME: CPT

## 2020-08-07 PROCEDURE — 36415 COLL VENOUS BLD VENIPUNCTURE: CPT

## 2020-08-07 PROCEDURE — 6360000004 HC RX CONTRAST MEDICATION: Performed by: PHYSICIAN ASSISTANT

## 2020-08-07 PROCEDURE — 82565 ASSAY OF CREATININE: CPT

## 2020-08-07 RX ORDER — AMOXICILLIN AND CLAVULANATE POTASSIUM 875; 125 MG/1; MG/1
1 TABLET, FILM COATED ORAL 2 TIMES DAILY
Qty: 20 TABLET | Refills: 0 | Status: SHIPPED | OUTPATIENT
Start: 2020-08-07 | End: 2020-08-17

## 2020-08-07 RX ADMIN — IOPAMIDOL 90 ML: 755 INJECTION, SOLUTION INTRAVENOUS at 11:25

## 2020-08-07 ASSESSMENT — ENCOUNTER SYMPTOMS
COLOR CHANGE: 0
SINUS PRESSURE: 0
VOMITING: 0
EYE REDNESS: 0
NAUSEA: 0
CHEST TIGHTNESS: 0
ABDOMINAL PAIN: 0
PHOTOPHOBIA: 0
BACK PAIN: 0
COUGH: 0
SORE THROAT: 0

## 2020-08-07 NOTE — ED PROVIDER NOTES
[place] ENCOUNTER          Pt Name: Heriberto Mena  MRN: [id number]  [place] 1950  Date of evaluation: 8/7/2020  Emergency Physician: Shira Yang DO    CHIEF COMPLAINT       Chief Complaint   Patient presents with    Abscess    Diverticulosis     History obtained from the patient. HISTORY OF PRESENT ILLNESS    HPI  Heriberto Mena is a 71 y.o. male who presents to the emergency department for evaluation of incidental abdominal abscess on OP Ct scan. Patient had an outpatient CT scan for AAA aneurysm monitoring. His aneurysm had look similar to prior. It was noted that he was found to have a colonic abscess and he was sent to the ED for evaluation and possible CT-guided drainage. Patient states approximately 1 year ago he had a significant bout of diverticulitis. He states he was taking care of and evaluated at a hospital in [address] [place] [address]. He states he was on antibiotics for a week and admitted to the hospital.  He states his gotten better. He reports intermittent night sweats a few times a month. No fevers no chills no abdominal pain no blood in his stool. He states otherwise he is feeling fine. He is on Xarelto for atrial fibrillation post ablation. The patient has no other acute complaints at this time. REVIEW OF SYSTEMS   Review of Systems   Constitutional: Negative for activity change, chills and fever. HENT: Negative for congestion, sinus pressure and sore throat. Eyes: Negative for photophobia, redness and visual disturbance. Respiratory: Negative for cough and chest tightness. Cardiovascular: Negative for chest pain, palpitations and leg swelling. Gastrointestinal: Negative for abdominal pain, nausea and vomiting. Endocrine: Negative for polydipsia and polyuria. Genitourinary: Negative for decreased urine volume, difficulty urinating, dysuria, flank pain, frequency and urgency.    Musculoskeletal: Negative for back pain, myalgias and neck pain. Skin: Negative for color change and rash. Neurological: Negative for weakness and headaches. Hematological: Negative for adenopathy. Does not bruise/bleed easily. Psychiatric/Behavioral: Negative for confusion and self-injury. PAST MEDICAL AND SURGICAL HISTORY     Past Medical History:   Diagnosis Date    Cancer ([facility] [address]     skin    GERD (gastroesophageal reflux disease)     Hyperlipidemia     Hypertension     Unspecified sleep apnea      Past Surgical History:   Procedure Laterality Date    ATRIAL ABLATION SURGERY  2000    unsure of physician    CHOLECYSTECTOMY  5-30-12    Dr. Ghislaine Pop  2010     Dr. Magda Fonseca  2007    Dr. Camilla Dyer  2010    Dr. Alayna Watson   No current facility-administered medications for this encounter. Current Outpatient Medications:     flecainide (TAMBOCOR) 100 MG tablet, Take 100mg in AM and 50in PM, Disp: 135 tablet, Rfl: 0    rivaroxaban (XARELTO) 20 MG TABS tablet, TAKE 1 TABLET EVERY 24     HOURS, Disp: 90 tablet, Rfl: 0    b complex vitamins capsule, Take 1 capsule by mouth daily, Disp: , Rfl:     aspirin 81 MG tablet, Take 81 mg by mouth daily, Disp: , Rfl:     Calcium Carb-Cholecalciferol (CALCIUM 1000 + D PO), Take by mouth, Disp: , Rfl:     Fexofenadine HCl (MUCINEX ALLERGY PO), Take by mouth, Disp: , Rfl:     valsartan-hydrochlorothiazide (DIOVAN HCT) 320-25 MG per tablet, Take 1 tablet by mouth daily, Disp: 90 tablet, Rfl: 4    Atorvastatin Calcium (LIPITOR PO), Take 20 mg by mouth daily. , Disp: , Rfl:     FENOFIBRATE PO, Take 54 mg by mouth daily. , Disp: , Rfl:     atenolol (TENORMIN) 50 MG tablet, Take 50 mg by mouth See Admin Instructions 50 in am and 25 in pm, Disp: , Rfl:     guaiFENesin (MUCINEX) 600 MG SR tablet, Take 600 mg by mouth every other day , Disp: , Rfl:     Methylcellulose, Laxative, (CITRUCEL) 500 MG TABS, Take 500 mg by mouth daily. , Disp: , Rfl:     montelukast (SINGULAIR) 10 MG tablet, Take 10 mg by mouth nightly.  , Disp: , Rfl:     therapeutic multivitamin-minerals (THERAGRAN-M) tablet, Take 1 tablet by mouth daily. , Disp: , Rfl:     omeprazole (PRILOSEC) 20 MG capsule, Take 20 mg by mouth every other day , Disp: , Rfl:       SOCIAL HISTORY     Social History     Social History Narrative    Not on file     Social History     Tobacco Use    Smoking status: Never Smoker    Smokeless tobacco: Never Used   Substance Use Topics    Alcohol use: Yes     Comment: 2 glasses of wine per night until getting sick     Drug use: No         ALLERGIES     Allergies   Allergen Reactions    Celecoxib Hives    Ciprofloxacin     Flagyl [Metronidazole]     Sulfa Antibiotics Other (See Comments)     unsure         FAMILY HISTORY     Family History   Problem Relation Age of Onset    Dementia Mother     High Blood Pressure Father     Heart Failure Father          PHYSICAL EXAM     ED Triage Vitals [08/07/20 1432]   BP Temp Temp Source Pulse Resp SpO2 Height Weight   (!) 148/76 97.9 °F (36.6 °C) Oral 52 17 98 % 5' 10\" (1.778 m) 195 lb (88.5 kg)     Initial vital signs and nursing assessment reviewed and normal. Pulsoximetry is normal per my interpretation. Additional Vital Signs:  Vitals:    08/07/20 1609   BP: (!) 150/69   Pulse: (!) 48   Resp: 12   Temp:    SpO2: 96%       Physical Exam  Vitals signs and nursing note reviewed. Constitutional:       General: He is not in acute distress. Appearance: He is well-developed. He is not diaphoretic. HENT:      Head: Normocephalic. Eyes:      Pupils: Pupils are equal, round, and reactive to light. Neck:      Musculoskeletal: Normal range of motion and neck supple. Vascular: No JVD. Cardiovascular:      Rate and Rhythm: Normal rate and regular rhythm. Heart sounds: Normal heart sounds.    Pulmonary:      Effort: Pulmonary effort is normal.      Breath sounds: Normal breath sounds. Abdominal:      General: Bowel sounds are normal. There is no distension. Palpations: Abdomen is soft. Tenderness: There is no abdominal tenderness. Musculoskeletal: Normal range of motion. General: No tenderness or deformity. Skin:     General: Skin is warm and dry. Capillary Refill: Capillary refill takes less than 2 seconds. Neurological:      Mental Status: He is alert and oriented to person, place, and time. GCS: GCS eye subscore is 4. GCS verbal subscore is 5. GCS motor subscore is 6. Cranial Nerves: No cranial nerve deficit. Sensory: No sensory deficit. Motor: No abnormal muscle tone. Comments: Moves all extremities              MEDICAL DECISION MAKING   Initial Assessment: Patient with an incidental finding on CT scan. Patient is well-appearing his abdominal exam is benign. He has been asymptomatic. No fever. plan: CBC BMP INR, coordinate care with surgery and interventional radiology.         ED RESULTS   Laboratory results:  Labs Reviewed   CBC WITH AUTO DIFFERENTIAL - Abnormal; Notable for the following components:       Result Value    MCV 94.8 (*)     MCH 33.8 (*)     MCHC 35.7 (*)     RDW-SD 45.8 (*)     All other components within normal limits   PROTIME-INR - Abnormal; Notable for the following components:    INR 2.01 (*)     All other components within normal limits   URINE RT REFLEX TO CULTURE - Abnormal; Notable for the following components:    Specific Gravity, Urine > 1.030 (*)     All other components within normal limits   OSMOLALITY - Abnormal; Notable for the following components:    Osmolality Calc 274.4 (*)     All other components within normal limits   GLOMERULAR FILTRATION RATE, ESTIMATED - Abnormal; Notable for the following components:    Est, Glom Filt Rate 74 (*)     All other components within normal limits   CULTURE, BLOOD 2   CULTURE, BLOOD 1   COMPREHENSIVE METABOLIC PANEL   LACTIC ACID, PLASMA   ANION GAP       Radiologic studies results:  CT DRAINAGE HEMATOMA/SEROMA/FLUID COLLECTION    (Results Pending)       ED Medications administered this visit: Medications - No data to display      ED COURSE     ED Course as of Aug 07 2230   Fri Aug 07, 2020   1709 Discussed case with Dr. Mat Lewis, patient scheduled for IR drainage of sigmoid abscess on Monday at 11 AM.  Patient to hold Xarelto for 3 days and remain n.p.o. prior to procedure for 4 hours. [DD]   1710 Discussed case with Dr. Garland Guy she states have patient call for follow-up for the following Tuesday on 08/18/20. Placed patient on Augmentin    [DD]   2229 Patient with history of bradycardia. It appears to be sinus bradycardia his heart rate ranges from 48-60. He states this is typical for him. [DD]      ED Course User Index  [DD] Sary Stiles DO      The diagnosis, extensive differential diagnosis, laboratory/imaging findings, and return precautions were discussed at the bedside. The patient was an active participant in their care. They are agreeable to the plan of care. All questions and concerns were addressed at the time of the encounter. MEDICATION CHANGES     Discharge Medication List as of 8/7/2020  5:21 PM      START taking these medications    Details   amoxicillin-clavulanate (AUGMENTIN) 875-125 MG per tablet Take 1 tablet by mouth 2 times daily for 10 days, Disp-20 tablet,R-0Print               FINAL DISPOSITION     Final diagnoses:   Colonic diverticular abscess     Condition: condition: good  Dispo: Discharge to home      This transcription was electronically signed. Parts of this transcriptions may have been dictated by use of voice recognition software and electronically transcribed, and parts may have been transcribed with the assistance of an ED scribe. The transcription may contain errors not detected in proofreading.   Please refer to my supervising physician's documentation if my documentation differs.     Electronically Signed: Sary Stiles, 08/07/20, 5:08 PM      Sary Stiles DO  08/07/20 7543

## 2020-08-07 NOTE — ED TRIAGE NOTES
Pt comes to the ED with an abnormal CT result. Pt was having a routine CT scan to monitor a known aortic aneurysm. Pt was then called and was told he has \"fluid on his bladder\" and was told to come in. Pt denies any complaints at this time.

## 2020-08-10 ENCOUNTER — HOSPITAL ENCOUNTER (OUTPATIENT)
Dept: NURSING | Age: 70
Discharge: HOME | End: 2020-08-10
Payer: MEDICARE

## 2020-08-10 ENCOUNTER — HOSPITAL ENCOUNTER (OUTPATIENT)
Dept: CT IMAGING | Age: 70
Discharge: HOME | End: 2020-08-10
Attending: EMERGENCY MEDICINE | Admitting: EMERGENCY MEDICINE
Payer: MEDICARE

## 2020-08-10 VITALS
TEMPERATURE: 97.3 F | SYSTOLIC BLOOD PRESSURE: 139 MMHG | OXYGEN SATURATION: 97 % | DIASTOLIC BLOOD PRESSURE: 77 MMHG | RESPIRATION RATE: 16 BRPM | HEART RATE: 48 BPM | WEIGHT: 195 LBS | BODY MASS INDEX: 27.98 KG/M2

## 2020-08-10 VITALS
RESPIRATION RATE: 16 BRPM | SYSTOLIC BLOOD PRESSURE: 128 MMHG | OXYGEN SATURATION: 90 % | HEART RATE: 46 BPM | DIASTOLIC BLOOD PRESSURE: 63 MMHG

## 2020-08-10 LAB
APTT: 36.4 SECONDS (ref 22–38)
ERYTHROCYTE [DISTWIDTH] IN BLOOD BY AUTOMATED COUNT: 13.4 % (ref 11.5–14.5)
ERYTHROCYTE [DISTWIDTH] IN BLOOD BY AUTOMATED COUNT: 46.5 FL (ref 35–45)
HCT VFR BLD CALC: 45.5 % (ref 42–52)
HEMOGLOBIN: 16.4 GM/DL (ref 14–18)
INR BLD: 1.05 (ref 0.85–1.13)
MCH RBC QN AUTO: 34.4 PG (ref 26–33)
MCHC RBC AUTO-ENTMCNC: 36 GM/DL (ref 32.2–35.5)
MCV RBC AUTO: 95.4 FL (ref 80–94)
PLATELET # BLD: 164 THOU/MM3 (ref 130–400)
PMV BLD AUTO: 10.6 FL (ref 9.4–12.4)
RBC # BLD: 4.77 MILL/MM3 (ref 4.7–6.1)
WBC # BLD: 5.5 THOU/MM3 (ref 4.8–10.8)

## 2020-08-10 PROCEDURE — 85610 PROTHROMBIN TIME: CPT

## 2020-08-10 PROCEDURE — 36415 COLL VENOUS BLD VENIPUNCTURE: CPT

## 2020-08-10 PROCEDURE — 85027 COMPLETE CBC AUTOMATED: CPT

## 2020-08-10 PROCEDURE — 87205 SMEAR GRAM STAIN: CPT

## 2020-08-10 PROCEDURE — 10140 I&D HMTMA SEROMA/FLUID COLLJ: CPT

## 2020-08-10 PROCEDURE — 87070 CULTURE OTHR SPECIMN AEROBIC: CPT

## 2020-08-10 PROCEDURE — 6360000002 HC RX W HCPCS

## 2020-08-10 PROCEDURE — 87075 CULTR BACTERIA EXCEPT BLOOD: CPT

## 2020-08-10 PROCEDURE — 6370000000 HC RX 637 (ALT 250 FOR IP)

## 2020-08-10 PROCEDURE — 85730 THROMBOPLASTIN TIME PARTIAL: CPT

## 2020-08-10 PROCEDURE — 77012 CT SCAN FOR NEEDLE BIOPSY: CPT

## 2020-08-10 RX ORDER — BACITRACIN, NEOMYCIN, POLYMYXIN B 400; 3.5; 5 [USP'U]/G; MG/G; [USP'U]/G
OINTMENT TOPICAL ONCE
Status: COMPLETED | OUTPATIENT
Start: 2020-08-10 | End: 2020-08-10

## 2020-08-10 RX ORDER — MIDAZOLAM HYDROCHLORIDE 1 MG/ML
1 INJECTION INTRAMUSCULAR; INTRAVENOUS ONCE
Status: COMPLETED | OUTPATIENT
Start: 2020-08-10 | End: 2020-08-10

## 2020-08-10 RX ORDER — SODIUM CHLORIDE 450 MG/100ML
INJECTION, SOLUTION INTRAVENOUS CONTINUOUS
Status: DISCONTINUED | OUTPATIENT
Start: 2020-08-10 | End: 2020-08-11 | Stop reason: HOSPADM

## 2020-08-10 RX ORDER — FENTANYL CITRATE 50 UG/ML
50 INJECTION, SOLUTION INTRAMUSCULAR; INTRAVENOUS ONCE
Status: COMPLETED | OUTPATIENT
Start: 2020-08-10 | End: 2020-08-10

## 2020-08-10 RX ADMIN — MIDAZOLAM HYDROCHLORIDE 1 MG: 1 INJECTION INTRAMUSCULAR; INTRAVENOUS at 14:03

## 2020-08-10 RX ADMIN — BACITRACIN, NEOMYCIN, POLYMYXIN B 1 G: 400; 3.5; 5 OINTMENT TOPICAL at 14:10

## 2020-08-10 RX ADMIN — MIDAZOLAM HYDROCHLORIDE 1 MG: 1 INJECTION INTRAMUSCULAR; INTRAVENOUS at 14:00

## 2020-08-10 RX ADMIN — FENTANYL CITRATE 50 MCG: 50 INJECTION, SOLUTION INTRAMUSCULAR; INTRAVENOUS at 14:05

## 2020-08-10 ASSESSMENT — PAIN SCALES - GENERAL
PAINLEVEL_OUTOF10: 5
PAINLEVEL_OUTOF10: 0

## 2020-08-10 ASSESSMENT — PAIN - FUNCTIONAL ASSESSMENT: PAIN_FUNCTIONAL_ASSESSMENT: 0-10

## 2020-08-10 NOTE — PROGRESS NOTES
1030  pt admitted to opn per ambulation for a aspiration. Family, marilee  at bedside. Pt talha well. Dr Jesus Alberto Limon called and updated for orders   1130 resting quietly in room. 1247 pt up to bathroom. Gait steady. Awaiting procedure. PT RIGHTS AND RESPONSIBILITIES OFFERED TO PT.  1320 pt taken per cart for procedure with family. 1420 pt return to floor per cart with wife. bandaid to lower mid abdomen area dry and intact. No swelling , bleeding noted to area. Alert. resp even and easy . Pt states no pain at present. Pt states he remains on antibiotics given in er. Of augmentin. 1440 taking liquids , diet in . No problems noted. 1500 dr Jesus Alberto Limon called and updated on patient condition. Okay for discharge in 1 hour from procedure. 1515 bandaid remains dry and intact. Denies pain, sob. Discharge instructions reviewed with patient and his wife, marilee. Verbalize understanding. Pt feels fine. Stable. 1525 assist up in room. Gait steady. Denies dizzyness, lightheadedness. [phone] discharge per wheelchair to car. Stable.

## 2020-08-10 NOTE — PROGRESS NOTES
Formulation and discussion of sedation / procedure plans, risks, benefits, side effects and alternatives with patient and/or responsible adult completed. The patient was counseled at length about the risks of silvia Covid-19 during their perioperative period and any recovery window from their procedure. The patient was made aware that silvia Covid-19  may worsen their prognosis for recovering from their procedure  and lend to a higher morbidity and/or mortality risk. All material risks, benefits, and reasonable alternatives including postponing the procedure were discussed. The patient does wish to proceed with the procedure at this time.         Electronically signed by Epifanio Joshi MD on 8/10/2020 at 3:39 PM

## 2020-08-10 NOTE — PROGRESS NOTES
1030 pt arrives to OPN for CT guided abdominal wall drainage. All questions and concerns addressed. 1035 PATIENT RIGHTS AND RESPONSIBILITIES SHEET OFFERED TO PT TO READ.  1100 up to restroom, gait steady, denies needs. Discussed post procedure care and sedation care.  Call light in reach  1130 report to Adele Scott

## 2020-08-10 NOTE — PROGRESS NOTES
12 Pt arrived to IR hold room for CT guided aspiration of diverticular abscess. 1326 Procedure explained using teach back method. Pt states understanding. 26 Dr. Edris Apley into assess patient and explain procedure. 1336 This procedure has been fully reviewed with the patient and written informed consent has been obtained. 1345 Patient assisted to table in supine position. Monitor attached to patient. Comfort ensured. 1348 Pre-procedure images obtained. 1355 Procedure begins. 1407 Procedure complete. 17ml of yellow fluid Samples obtained. 1409 Post-procedure images obtained. 1410 Monitor removed. Patient assisted to cart in supine position. Comfort ensured. 1416 Patient transported to [facility] in stable condition. 1417 10Ml of yellow fluid sent to the lab.

## 2020-08-10 NOTE — PROGRESS NOTES
[address] [address]  Sedation/Analgesia Post Sedation Record    Pt Name: Veto Grossman  MRN: [id number]  YOB: 1950  Procedure Performed By: Lorie Levine MD  Primary Care Physician: Charlee Bosworth, MD    POST-PROCEDURE    Physicians/Assistants: Lorie Levine MD    Procedure Performed: CT guided aspiration      Sedation/Anesthesia: [x] Local [x] Conscious Sedation with Fentanyl & Versed     Specimens Removed:  [x] Yes [] No        Disposition of Specimen:  [x] Pathology [] Disposed       Complications:   [x]None Immediate []Other:       Post-procedure Diagnosis/Findings:    1 cc cloudy yellow fluid removed. Estimated blood loss:          None        Recommendations: Follow post-procedure orders.          Lorie Levine MD  Electronically signed 8/10/2020 at 3:39 PM

## 2020-08-13 LAB
BLOOD CULTURE, ROUTINE: NORMAL
BLOOD CULTURE, ROUTINE: NORMAL

## 2020-08-13 RX ORDER — FLECAINIDE ACETATE 100 MG/1
TABLET ORAL
Qty: 135 TABLET | Refills: 0 | Status: SHIPPED | OUTPATIENT
Start: 2020-08-13 | End: 2021-01-05 | Stop reason: SDUPTHER

## 2020-08-14 LAB
AEROBIC CULTURE: NO GROWTH
ANAEROBIC CULTURE: NORMAL
GRAM STAIN RESULT: NORMAL

## 2020-08-18 ENCOUNTER — OFFICE VISIT (OUTPATIENT)
Dept: SURGERY | Age: 70
End: 2020-08-18
Payer: MEDICARE

## 2020-08-18 VITALS
RESPIRATION RATE: 14 BRPM | SYSTOLIC BLOOD PRESSURE: 110 MMHG | WEIGHT: 201.6 LBS | BODY MASS INDEX: 28.86 KG/M2 | HEART RATE: 47 BPM | DIASTOLIC BLOOD PRESSURE: 78 MMHG | OXYGEN SATURATION: 98 % | HEIGHT: 70 IN | TEMPERATURE: 97.4 F

## 2020-08-18 PROCEDURE — 99203 OFFICE O/P NEW LOW 30 MIN: CPT | Performed by: SURGERY

## 2020-08-25 ASSESSMENT — ENCOUNTER SYMPTOMS
SHORTNESS OF BREATH: 0
COUGH: 0
TROUBLE SWALLOWING: 0
DIARRHEA: 0
ABDOMINAL PAIN: 0
BLOOD IN STOOL: 0
CHEST TIGHTNESS: 0
BACK PAIN: 0
VOMITING: 0
SORE THROAT: 0
CONSTIPATION: 0
NAUSEA: 0

## 2020-08-25 NOTE — PROGRESS NOTES
Omayra Torres MD  [facility]    Pt Name: Africa Kelly  MRN: [id number]  YOB: 1950  Date of evaluation: 8/25/2020  Primary Care Physician: Deanna Jamison MD  Patient evaluated at the request of  Dr. Harjinder Sampson  Reason for evaluation: diverticular abscess  IMPRESSIONS:       ICD-10-CM    1. Intra-abdominal abscess ([address]  K65.1      does not have any pertinent problems on file. PLANS:   1. After discussing treatment options the patient including surgical and nonsurgical medical management we have decided to pursue nonoperative management. The patient's abscess cavity was completely drained on imaging, he is having 0 symptoms. At this time he is scheduled for repeat CT scan to evaluate his aneurysm, this will allow us to make sure that no collection has recurred. I imagine that his collection is old and that is why it was so well to off and he was completely asymptomatic from it. We did discuss that if he develops pain nausea vomiting that he should return to the ER or the clinic to be reevaluated and possible repeat CT scan. Patient should follow with his primary care to ensure he continues routine screening colonoscopies. SUBJECTIVE:     CC: Diverticular abscess  History of Chief Complaint:    Jeff Simmons is a 71 y.o.male who was undergoing a CT of his abdomen to evaluate his known aortic aneurysm. On his CT scan there was found to be a intra-abdominal abscess likely diverticular in origin. He was told to go the emergency department. He presented the emergency department where he was found to be asymptomatic by the physicians. He was scheduled with interventional radiology for drainage of the abscess in the coming days as patient was anticoagulated. He was started on antibiotics and discharged home. He underwent uncomplicated abscess drainage. Patient states he did have some discomfort when it was drained but otherwise no complaints. He denies any fevers or chills.     Past Medical History  Past Medical History:   Diagnosis Date    Cancer ([facility] [address]     skin    Diverticulitis     Eosinophilic esophagitis     Dr. Soco Vu GERD (gastroesophageal reflux disease)     Hyperlipidemia     Hypertension     Unspecified sleep apnea        Past Surgical History  Past Surgical History:   Procedure Laterality Date    ATRIAL ABLATION SURGERY  2000    unsure of physician    CHOLECYSTECTOMY  5-30-12    Dr. Eduin Vasquez  2010     Dr. Mimi Robles  2007    Dr. Mickie Lopes  2010    Dr. Ragini Burgos       Medications  Current Outpatient Medications on File Prior to Visit   Medication Sig Dispense Refill    flecainide (TAMBOCOR) 100 MG tablet TAKE 1 TABLET (=100MG) EVERY MORNING AND TAKE 1/2 TABLET (=50MG) IN THE EVENING. THIS IS CORRECTED PRESCRIPTION 135 tablet 0    rivaroxaban (XARELTO) 20 MG TABS tablet TAKE 1 TABLET EVERY 24     HOURS 90 tablet 0    b complex vitamins capsule Take 1 capsule by mouth daily      aspirin 81 MG tablet Take 81 mg by mouth daily      Calcium Carb-Cholecalciferol (CALCIUM 1000 + D PO) Take by mouth      Fexofenadine HCl (MUCINEX ALLERGY PO) Take by mouth      valsartan-hydrochlorothiazide (DIOVAN HCT) 320-25 MG per tablet Take 1 tablet by mouth daily 90 tablet 4    Atorvastatin Calcium (LIPITOR PO) Take 20 mg by mouth daily.  FENOFIBRATE PO Take 54 mg by mouth daily.  atenolol (TENORMIN) 50 MG tablet Take 50 mg by mouth See Admin Instructions 50 in am and 25 in pm      guaiFENesin (MUCINEX) 600 MG SR tablet Take 600 mg by mouth every other day       Methylcellulose, Laxative, (CITRUCEL) 500 MG TABS Take 500 mg by mouth daily.  montelukast (SINGULAIR) 10 MG tablet Take 10 mg by mouth nightly.  therapeutic multivitamin-minerals (THERAGRAN-M) tablet Take 1 tablet by mouth daily.         omeprazole (PRILOSEC) 20 MG capsule Take 20 mg by mouth every other day        No current for cough, chest tightness and shortness of breath. Cardiovascular: Negative for chest pain, palpitations and leg swelling. Gastrointestinal: Negative for abdominal pain, blood in stool, constipation, diarrhea, nausea and vomiting. Endocrine: Negative for cold intolerance. Genitourinary: Negative for difficulty urinating and urgency. Musculoskeletal: Negative for back pain and joint swelling. Skin: Negative for rash and wound. Allergic/Immunologic: Negative for immunocompromised state. Neurological: Negative for seizures and headaches. Psychiatric/Behavioral: Negative for hallucinations and suicidal ideas. The patient is not nervous/anxious. OBJECTIVE:   CURRENT VITALS:  height is 5' 10\" (1.778 m) and weight is 201 lb 9.6 oz (91.4 kg). His tympanic temperature is 97.4 °F (36.3 °C). His blood pressure is 110/78 and his pulse is 47 (abnormal). His respiration is 14 and oxygen saturation is 98%. Body mass index is 28.93 kg/m². Physical Exam  Vitals signs reviewed. Constitutional:       Appearance: Normal appearance. He is well-developed. HENT:      Head: Normocephalic and atraumatic. Nose: Nose normal.   Eyes:      General: No scleral icterus. Extraocular Movements: Extraocular movements intact. Pupils: Pupils are equal, round, and reactive to light. Neck:      Musculoskeletal: Normal range of motion. Thyroid: No thyromegaly. Cardiovascular:      Rate and Rhythm: Normal rate. Pulses: Normal pulses. Pulmonary:      Effort: Pulmonary effort is normal. No respiratory distress. Abdominal:      Palpations: Abdomen is soft. There is no mass. Tenderness: There is no abdominal tenderness. There is no guarding or rebound. Hernia: No hernia is present. Musculoskeletal: Normal range of motion. Skin:     Findings: No erythema. Neurological:      General: No focal deficit present. Mental Status: He is alert and oriented to person, place, and time. Psychiatric:         Mood and Affect: Mood normal.         Behavior: Behavior normal.         LABS:   No results for input(s): WBC, HGB, HCT, PLT, NA, K, CL, CO2, BUN, CREATININE, MG, PHOS, CALCIUM, PTT, INR, AST, ALT, BILITOT, BILIDIR, AMYLASE, LIPASE, LDH, LACTA, NITRU, COLORU, BACTERIA in the last 72 hours. Invalid input(s): PT, WBCU, RBCU, LEUKOCYTESUA  RADIOLOGY:   I have personally reviewed the following films:  1. Extensive diverticulosis coli. Mild atelectatic/fibrotic stranding both lung bases. Hepatic steatosis. Benign-appearing liver cysts. Prior cholecystectomy. Bilateral inguinal hernias again only adipose tissue. 2. Mildly aneurysmal distal descending thoracic aorta, 3.2 cm. Bilobed aneurysmal dilatation distal abdominal aorta, greatest diameter of 3.4 cm. Questionable ulcerated atherosclerotic plaque versus saccular aneurysm. See comments above. Both aneurysmal    foci of the abdominal aorta have increased in diameter slightly since prior study. 3. 3.8 cm walled off fluid collection superior to the bladder and apparently emanating from the sigmoid colon with mild adjacent soft tissue stranding, suspicious for diverticular abscess       Thank you for the interesting evaluation. Further recommendations to follow.     Electronically signed by Horacio Hyman MD on 8/25/2020 at 9:14 AM

## 2021-01-05 ENCOUNTER — OFFICE VISIT (OUTPATIENT)
Dept: CARDIOLOGY CLINIC | Age: 71
End: 2021-01-05
Payer: MEDICARE

## 2021-01-05 VITALS
HEART RATE: 54 BPM | DIASTOLIC BLOOD PRESSURE: 72 MMHG | SYSTOLIC BLOOD PRESSURE: 132 MMHG | HEIGHT: 70 IN | WEIGHT: 190 LBS | BODY MASS INDEX: 27.2 KG/M2

## 2021-01-05 DIAGNOSIS — I10 ESSENTIAL HYPERTENSION: ICD-10-CM

## 2021-01-05 DIAGNOSIS — I48.0 PAROXYSMAL ATRIAL FIBRILLATION (HCC): Primary | ICD-10-CM

## 2021-01-05 PROCEDURE — 4040F PNEUMOC VAC/ADMIN/RCVD: CPT | Performed by: NUCLEAR MEDICINE

## 2021-01-05 PROCEDURE — 99213 OFFICE O/P EST LOW 20 MIN: CPT | Performed by: NUCLEAR MEDICINE

## 2021-01-05 PROCEDURE — G8484 FLU IMMUNIZE NO ADMIN: HCPCS | Performed by: NUCLEAR MEDICINE

## 2021-01-05 PROCEDURE — 1036F TOBACCO NON-USER: CPT | Performed by: NUCLEAR MEDICINE

## 2021-01-05 PROCEDURE — 1123F ACP DISCUSS/DSCN MKR DOCD: CPT | Performed by: NUCLEAR MEDICINE

## 2021-01-05 PROCEDURE — G8427 DOCREV CUR MEDS BY ELIG CLIN: HCPCS | Performed by: NUCLEAR MEDICINE

## 2021-01-05 PROCEDURE — 3017F COLORECTAL CA SCREEN DOC REV: CPT | Performed by: NUCLEAR MEDICINE

## 2021-01-05 PROCEDURE — G8417 CALC BMI ABV UP PARAM F/U: HCPCS | Performed by: NUCLEAR MEDICINE

## 2021-01-05 RX ORDER — VALSARTAN AND HYDROCHLOROTHIAZIDE 320; 25 MG/1; MG/1
1 TABLET, FILM COATED ORAL DAILY
Qty: 90 TABLET | Refills: 4 | Status: SHIPPED | OUTPATIENT
Start: 2021-01-05 | End: 2022-01-03

## 2021-01-05 RX ORDER — FLECAINIDE ACETATE 100 MG/1
TABLET ORAL
Qty: 135 TABLET | Refills: 3 | Status: SHIPPED | OUTPATIENT
Start: 2021-01-05 | End: 2021-11-10

## 2021-01-05 NOTE — PROGRESS NOTES
Pt here for fu   Denies chest pain, dizziness, SOB
Extremities:   No edema, no obvious claudication       Objective:  Physical Exam  /72   Pulse 54   Ht 5' 10\" (1.778 m)   Wt 190 lb (86.2 kg)   BMI 27.26 kg/m²   General:   Well developed, well nourished  Lungs:   Clear to auscultation  Heart:    Normal S1 S2, Slight murmur. no rubs, no gallops  Abdomen:   Soft, non tender, no organomegalies, positive bowel sounds  Extremities:   No edema, no cyanosis, good peripheral pulses  Neurological:   Awake, alert, oriented. No obvious focal deficits  Musculoskelatal:  No obvious deformities    Assessment:      Diagnosis Orders   1. Paroxysmal atrial fibrillation (HCC)     2. Essential hypertension     as above  No new issues  Cardiac fair    Plan:  No follow-ups on file. As above  Continue risk factor modification and medical management  ,Thank you for allowing me to participate in the care of your patient. Please don't hesitate to contact me regarding any further issues related to the patient care    Orders Placed:  No orders of the defined types were placed in this encounter. Medications Prescribed:  No orders of the defined types were placed in this encounter. Discussed use, benefit, and side effects of prescribed medications. All patient questions answered. Pt voicedunderstanding. Instructed to continue current medications, diet and exercise. Continue risk factor modification and medical management. Patient agreed with treatment plan. Follow up as directed.     Electronically signedby Anson Diaz MD on 1/5/2021 at 11:12 AM

## 2021-04-26 ENCOUNTER — TELEPHONE (OUTPATIENT)
Dept: CARDIOLOGY CLINIC | Age: 71
End: 2021-04-26

## 2021-04-26 NOTE — TELEPHONE ENCOUNTER
Dr Elma Calderon office is calling to get Zach's 01-05-21 OV, please fax to [phone].  Royce Barrientos

## 2021-08-11 ENCOUNTER — HOSPITAL ENCOUNTER (OUTPATIENT)
Dept: CT IMAGING | Age: 71
Discharge: HOME | End: 2021-08-11
Payer: MEDICARE

## 2021-08-11 ENCOUNTER — TELEPHONE (OUTPATIENT)
Dept: CARDIOTHORACIC SURGERY | Age: 71
End: 2021-08-11

## 2021-08-11 DIAGNOSIS — I71.40 ABDOMINAL AORTIC ANEURYSM (AAA) WITHOUT RUPTURE: ICD-10-CM

## 2021-08-11 DIAGNOSIS — I71.40 ABDOMINAL AORTIC ANEURYSM (AAA) WITHOUT RUPTURE: Primary | ICD-10-CM

## 2021-08-11 LAB — POC CREATININE WHOLE BLOOD: 1.2 MG/DL (ref 0.5–1.2)

## 2021-08-11 PROCEDURE — 6360000004 HC RX CONTRAST MEDICATION: Performed by: PHYSICIAN ASSISTANT

## 2021-08-11 PROCEDURE — 74174 CTA ABD&PLVS W/CONTRAST: CPT

## 2021-08-11 PROCEDURE — 82565 ASSAY OF CREATININE: CPT

## 2021-08-11 RX ADMIN — IOPAMIDOL 90 ML: 755 INJECTION, SOLUTION INTRAVENOUS at 12:16

## 2021-08-11 NOTE — TELEPHONE ENCOUNTER
Vania from CT scan called  Needs new order for CT today - needs to be CTA Abd/Pelvis  Order placed. Dx: AAA  Please agree to new order.

## 2021-08-13 ENCOUNTER — TELEPHONE (OUTPATIENT)
Dept: CARDIOTHORACIC SURGERY | Age: 71
End: 2021-08-13

## 2021-08-16 ENCOUNTER — OFFICE VISIT (OUTPATIENT)
Dept: CARDIOTHORACIC SURGERY | Age: 71
End: 2021-08-16
Payer: MEDICARE

## 2021-08-16 VITALS
DIASTOLIC BLOOD PRESSURE: 77 MMHG | OXYGEN SATURATION: 97 % | BODY MASS INDEX: 28.63 KG/M2 | SYSTOLIC BLOOD PRESSURE: 151 MMHG | HEIGHT: 70 IN | WEIGHT: 200 LBS | HEART RATE: 45 BPM

## 2021-08-16 DIAGNOSIS — R93.5 ABNORMAL COMPUTED TOMOGRAPHY ANGIOGRAPHY (CTA) OF ABDOMEN AND PELVIS: ICD-10-CM

## 2021-08-16 DIAGNOSIS — I71.40 ABDOMINAL AORTIC ANEURYSM (AAA) WITHOUT RUPTURE: Primary | ICD-10-CM

## 2021-08-16 PROCEDURE — 99214 OFFICE O/P EST MOD 30 MIN: CPT | Performed by: PHYSICIAN ASSISTANT

## 2021-08-16 NOTE — PROGRESS NOTES
findings were telephoned to [facility] , 1307 hours. PastMedical History:  Hilaria Moe  has a past medical history of Cancer ([address] Diverticulitis, Eosinophilic esophagitis, GERD (gastroesophageal reflux disease), Hyperlipidemia, Hypertension, and Unspecified sleep apnea. Past Surgical History:  The patient  has a past surgical history that includes Atrial ablation surgery (2000); Cholecystectomy (5-30-12); Skin cancer excision (2007); Colonoscopy (2010 ); and Upper gastrointestinal endoscopy (2010). Allergies: The patient is allergic to celecoxib, ciprofloxacin, flagyl [metronidazole], and sulfa antibiotics. Medications:  Prior to Admission medications    Medication Sig Start Date End Date Taking? Authorizing Provider   flecainide (TAMBOCOR) 100 MG tablet TAKE 1 TABLET (=100MG) EVERY MORNING AND TAKE 1/2 TABLET (=50MG) IN THE EVENING. THIS IS CORRECTED PRESCRIPTION 1/5/21  Yes Agnes Rivas MD   valsartan-hydroCHLOROthiazide (DIOVAN HCT) 320-25 MG per tablet Take 1 tablet by mouth daily 1/5/21  Yes Agnes Rivas MD   rivaroxaban (XARELTO) 20 MG TABS tablet TAKE 1 TABLET EVERY 24     HOURS 12/28/20  Yes Agnes Rivas MD   b complex vitamins capsule Take 1 capsule by mouth daily   Yes Historical Provider, MD   aspirin 81 MG tablet Take 81 mg by mouth daily   Yes Historical Provider, MD   Calcium Carb-Cholecalciferol (CALCIUM 1000 + D PO) Take by mouth   Yes Historical Provider, MD   Fexofenadine HCl (MUCINEX ALLERGY PO) Take by mouth   Yes Historical Provider, MD   Atorvastatin Calcium (LIPITOR PO) Take 20 mg by mouth daily. Yes Historical Provider, MD   FENOFIBRATE PO Take 54 mg by mouth daily.    Yes Historical Provider, MD   atenolol (TENORMIN) 50 MG tablet Take 50 mg by mouth See Admin Instructions 50 in am and 25 in pm   Yes Historical Provider, MD   guaiFENesin (MUCINEX) 600 MG SR tablet Take 600 mg by mouth every other day    Yes Historical Provider, MD   Methylcellulose, Laxative, (CITRUCEL) 500 MG TABS Take 500 mg by mouth daily. Yes Historical Provider, MD   montelukast (SINGULAIR) 10 MG tablet Take 10 mg by mouth nightly. Yes Historical Provider, MD   therapeutic multivitamin-minerals (THERAGRAN-M) tablet Take 1 tablet by mouth daily. Yes Historical Provider, MD   omeprazole (PRILOSEC) 20 MG capsule Take 20 mg by mouth every other day    Yes Historical Provider, MD       Family History: This patient's family history includes Dementia in his mother; Heart Failure in his father; High Blood Pressure in his father. Social History:  Annette Whitfield  reports that he has never smoked. He has never used smokeless tobacco. He reports current alcohol use. He reports that he does not use drugs. Vital Signs:   BP (!) 151/77 (Site: Left Upper Arm)   Pulse (!) 45   Ht 5' 10\" (1.778 m)   Wt 200 lb (90.7 kg)   SpO2 97%   BMI 28.70 kg/m²     ROS:   Constitutional: Negative for activity change, chills, fatigue, fever and unexpected weight change. Respiratory: Negative for apnea, shortness of breath, wheezing and stridor. Cardiovascular: Negative for chest pain, palpitations and leg swelling. Gastrointestinal: Negative for hematochezia, melana, constipation, and N/V/D. Musculoskeletal: Negative for myalgias  Skin: Negative for color change, rash and wound. Neurological: Negative for dizziness or syncope. Physical Exam:  General appearance:  No acute distress, appears stated age and cooperative. Neck: No jugular venous distention. Trachea midline. No carotid bruits. Cardiovascular:  Regular rate and rhythm with normal S1/S2 without murmurs, rubs or gallops. Abdomen: Soft, non-tender, non-distended with normal bowel sounds. Ext: No clubbing, cyanosis or edema bilaterally. Full range of motion without deformity. Skin: Skin color, texture, turgor normal.  No rashes or lesions. Neurologic:  Neurovascularly intact without any focal sensory/motor deficits.

## 2021-08-24 ENCOUNTER — OFFICE VISIT (OUTPATIENT)
Dept: SURGERY | Age: 71
End: 2021-08-24

## 2021-08-24 VITALS
TEMPERATURE: 97.2 F | BODY MASS INDEX: 28.77 KG/M2 | SYSTOLIC BLOOD PRESSURE: 128 MMHG | WEIGHT: 201 LBS | RESPIRATION RATE: 15 BRPM | DIASTOLIC BLOOD PRESSURE: 72 MMHG | HEART RATE: 67 BPM | OXYGEN SATURATION: 98 % | HEIGHT: 70 IN

## 2021-08-24 DIAGNOSIS — N30.80 EMPHYSEMATOUS CYSTITIS: Primary | ICD-10-CM

## 2021-08-24 PROCEDURE — 99024 POSTOP FOLLOW-UP VISIT: CPT | Performed by: SURGERY

## 2021-08-24 NOTE — PROGRESS NOTES
Patient was referred here for emphysematous cystitis, this does not seem a general surgery this would be a urology consult. Incidental finding of fat-containing inguinal hernias patient asymptomatic and does not need anything done with this.     Patient is not to be billed for this visit

## 2021-09-15 ENCOUNTER — OFFICE VISIT (OUTPATIENT)
Dept: UROLOGY | Age: 71
End: 2021-09-15
Payer: MEDICARE

## 2021-09-15 VITALS — WEIGHT: 200.1 LBS | HEIGHT: 70 IN | RESPIRATION RATE: 12 BRPM | BODY MASS INDEX: 28.65 KG/M2

## 2021-09-15 DIAGNOSIS — N30.80 EMPHYSEMATOUS CYSTITIS: Primary | ICD-10-CM

## 2021-09-15 LAB
BILIRUBIN URINE: NEGATIVE
BLOOD URINE, POC: NEGATIVE
CHARACTER, URINE: CLEAR
COLOR, URINE: YELLOW
GLUCOSE URINE: NEGATIVE MG/DL
KETONES, URINE: NEGATIVE
LEUKOCYTE CLUMPS, URINE: NEGATIVE
NITRITE, URINE: NEGATIVE
PH, URINE: 7.5 (ref 5–9)
PROTEIN, URINE: NEGATIVE MG/DL
SPECIFIC GRAVITY, URINE: 1.02 (ref 1–1.03)
UROBILINOGEN, URINE: 0.2 EU/DL (ref 0–1)

## 2021-09-15 PROCEDURE — 1123F ACP DISCUSS/DSCN MKR DOCD: CPT | Performed by: UROLOGY

## 2021-09-15 PROCEDURE — 1036F TOBACCO NON-USER: CPT | Performed by: UROLOGY

## 2021-09-15 PROCEDURE — 3017F COLORECTAL CA SCREEN DOC REV: CPT | Performed by: UROLOGY

## 2021-09-15 PROCEDURE — 99204 OFFICE O/P NEW MOD 45 MIN: CPT | Performed by: UROLOGY

## 2021-09-15 PROCEDURE — 4040F PNEUMOC VAC/ADMIN/RCVD: CPT | Performed by: UROLOGY

## 2021-09-15 PROCEDURE — G8427 DOCREV CUR MEDS BY ELIG CLIN: HCPCS | Performed by: UROLOGY

## 2021-09-15 PROCEDURE — 81003 URINALYSIS AUTO W/O SCOPE: CPT | Performed by: UROLOGY

## 2021-09-15 PROCEDURE — G8417 CALC BMI ABV UP PARAM F/U: HCPCS | Performed by: UROLOGY

## 2021-09-15 NOTE — PROGRESS NOTES
VINNIE Whittaker MD        [address] [address] [facility] [address]  Dept: [phone]  Dept Fax: [phone]: [address] [address] Urology Office Note -     Patient:  Shona Osorio  YOB: 1950  Date: 9/15/2021    The patient is a 79 y.o. male who presents today for evaluation of the following problems:   Chief Complaint   Patient presents with    Advice Only     New Patient, emphysematous cystitis, referred by Christina Wright MD    referred/consultation requested by Myron Barker MD.    HISTORY OF PRESENT ILLNESS:     Emphysematous cystitis  Hx of diverticulitis  Poss fistula?  Had IR drainage of a diverticular abscess last year  Completely asymptomatic   Mild LUTS freq and nocturia      Requested/reviewed records from Myron Barker MD office and/or outside [de-identified]    (Patient's old records have been requested, reviewed and pertinent findings summarized in today's note.)    Procedures Today: N/A      Last several PSA's:  No results found for: PSA    Last total testosterone:  No results found for: TESTOSTERONE    Urinalysis today:  Results for POC orders placed in visit on 09/15/21   POCT Urinalysis No Micro (Auto)   Result Value Ref Range    Glucose, Ur Negative NEGATIVE mg/dl    Bilirubin Urine Negative     Ketones, Urine Negative NEGATIVE    Specific Gravity, Urine 1.020 1.002 - 1.030    Blood, UA POC Negative NEGATIVE    pH, Urine 7.50 5.0 - 9.0    Protein, Urine Negative NEGATIVE mg/dl    Urobilinogen, Urine 0.20 0.0 - 1.0 eu/dl    Nitrite, Urine Negative NEGATIVE    Leukocyte Clumps, Urine Negative NEGATIVE    Color, Urine Yellow YELLOW-STRAW    Character, Urine Clear [id number].[id number]       Last BUN and creatinine:  Lab Results   Component Value Date    BUN 19 08/07/2020     Lab Results   Component Value Date    CREATININE 1.0 08/07/2020         Imaging Reviewed during this Office Visit: Sara Edmonds MD independently reviewed the images and verified the radiology reports from:    CTA ABDOMEN PELVIS W WO CONTRAST    Result Date: 8/13/2021  PROCEDURE: CTA ABDOMEN PELVIS W WO CONTRAST CLINICAL INFORMATION: Abdominal aortic aneurysm (AAA) without rupture ([address] . COMPARISON: No prior study. TECHNIQUE: 2-D multiplanar pre and postcontrast images of the abdomen and pelvis. Isovue-370 IV contrast. 3-D MIPS were performed on the postcontrast images. All CT scans at this facility use dose modulation, iterative reconstruction, and/or weight-based dosing when appropriate to reduce radiation dose to as low as reasonably achievable. FINDINGS: Precontrast 3.0 x 3.4 cm infrarenal abdominal aortic aneurysm. This is a bilobed aneurysm. The inferior aneurysm measures 3 x 2.6 cm. Calcific atherosclerosis in the common iliac arteries. Stable 4 cm cyst left lobe of the liver. Stable 1.8 cm cyst right lobe liver. No renal calculi. Postcontrast There is a probable short segment chronic dissection of the proximal larger aneurysm. Distal smaller aneurysm shows mural thrombus measuring 10 mm. Single renal artery bilaterally. These are patent. The celiac artery origin and the superior mesenteric artery origins are widely patent. Inferior mesenteric artery origin is widely patent. Bilateral common, internal and extra iliac arteries are widely patent. Stable cysts in the liver. Spleen is normal. Pancreas is normal. Prior cholecystectomy. Adrenals are normal. Kidneys enhance symmetrically and appear normal. Pelvis Uncomplicated colonic diverticulosis. Normal appendix. Bladder has low attenuation peripherally adjacent to the wall the bladder concerning for possible emphysematous cystitis. There is perivesical stranding also concerning for cystitis. Bilateral fat-containing inguinal hernias. Bones No suspicious bone lesions. Lung bases stable areas of parenchymal scarring. No infiltrates or effusions.  Undersurface of the heart defined types were placed in this encounter.      Orders Placed:  Orders Placed This Encounter   Procedures    POCT Urinalysis No Micro (Auto)            VINNIE Singletary MD

## 2021-09-30 ENCOUNTER — PROCEDURE VISIT (OUTPATIENT)
Dept: UROLOGY | Age: 71
End: 2021-09-30
Payer: MEDICARE

## 2021-09-30 VITALS — SYSTOLIC BLOOD PRESSURE: 108 MMHG | WEIGHT: 199 LBS | DIASTOLIC BLOOD PRESSURE: 70 MMHG | BODY MASS INDEX: 28.55 KG/M2

## 2021-09-30 DIAGNOSIS — N30.80 EMPHYSEMATOUS CYSTITIS: Primary | ICD-10-CM

## 2021-09-30 DIAGNOSIS — N99.115 POSTPROCEDURAL MALE FOSSA NAVICULARIS URETHRAL STRICTURE: ICD-10-CM

## 2021-09-30 LAB
BILIRUBIN URINE: NEGATIVE
BLOOD URINE, POC: NORMAL
CHARACTER, URINE: CLEAR
COLOR, URINE: YELLOW
GLUCOSE URINE: NEGATIVE MG/DL
KETONES, URINE: NEGATIVE
LEUKOCYTE CLUMPS, URINE: NEGATIVE
NITRITE, URINE: NEGATIVE
PH, URINE: 7 (ref 5–9)
PROTEIN, URINE: NEGATIVE MG/DL
SPECIFIC GRAVITY, URINE: 1.02 (ref 1–1.03)
UROBILINOGEN, URINE: 0.2 EU/DL (ref 0–1)

## 2021-09-30 PROCEDURE — 81003 URINALYSIS AUTO W/O SCOPE: CPT | Performed by: UROLOGY

## 2021-09-30 PROCEDURE — 52000 CYSTOURETHROSCOPY: CPT | Performed by: UROLOGY

## 2021-09-30 RX ORDER — PHENAZOPYRIDINE HYDROCHLORIDE 200 MG/1
200 TABLET, FILM COATED ORAL 3 TIMES DAILY PRN
Qty: 9 TABLET | Refills: 0 | Status: SHIPPED | OUTPATIENT
Start: 2021-09-30 | End: 2021-10-03

## 2021-09-30 NOTE — PROGRESS NOTES
Cystoscopy Operative Note    Patient:  Soniya Perla  MRN: [id number]  YOB: 1950    Date: 09/30/21  Surgeon: Sancho Kapadia MD  Anesthesia: Urethral 2% Xylocaine   Indications:     Emphysematous cystitis  Hx of diverticulitis  Poss fistula? Had IR drainage of a diverticular abscess last year  Completely asymptomatic   Mild LUTS freq and nocturia      Position: Supine    Findings:   The patient was prepped and draped in the usual sterile fashion. The flexible cystoscope was advanced through the urethra and into the bladder. The bladder was thoroughly inspected and the following was noted:    Residual Urine: Minimal   Urethra: distal urethral stricture bypassed with scope  Prostate: mild LLH+  Bladder: No tumors or CIS noted. No bladder diverticulum. Mild trabeculation noted. Ureters: Clear efflux from both ureters. Orifices with normal configuration and location. The cystoscope was removed. The patient tolerated the procedure well. Mild LUTS  No fistula found  Distal urethral stricture likely from difficult catheterization > 20 years ago. Iatrogenic.     Follow up PRN  Will give pyridium

## 2021-11-10 RX ORDER — FLECAINIDE ACETATE 100 MG/1
TABLET ORAL
Qty: 135 TABLET | Refills: 1 | Status: SHIPPED | OUTPATIENT
Start: 2021-11-10 | End: 2021-12-28 | Stop reason: SDUPTHER

## 2021-12-28 ENCOUNTER — OFFICE VISIT (OUTPATIENT)
Dept: CARDIOLOGY CLINIC | Age: 71
End: 2021-12-28
Payer: MEDICARE

## 2021-12-28 VITALS
WEIGHT: 204 LBS | HEIGHT: 71 IN | DIASTOLIC BLOOD PRESSURE: 68 MMHG | BODY MASS INDEX: 28.56 KG/M2 | HEART RATE: 58 BPM | SYSTOLIC BLOOD PRESSURE: 127 MMHG

## 2021-12-28 DIAGNOSIS — I48.0 PAROXYSMAL ATRIAL FIBRILLATION (HCC): Primary | ICD-10-CM

## 2021-12-28 DIAGNOSIS — I10 PRIMARY HYPERTENSION: ICD-10-CM

## 2021-12-28 PROCEDURE — G8427 DOCREV CUR MEDS BY ELIG CLIN: HCPCS | Performed by: NUCLEAR MEDICINE

## 2021-12-28 PROCEDURE — 1036F TOBACCO NON-USER: CPT | Performed by: NUCLEAR MEDICINE

## 2021-12-28 PROCEDURE — G8417 CALC BMI ABV UP PARAM F/U: HCPCS | Performed by: NUCLEAR MEDICINE

## 2021-12-28 PROCEDURE — 99213 OFFICE O/P EST LOW 20 MIN: CPT | Performed by: NUCLEAR MEDICINE

## 2021-12-28 PROCEDURE — 1123F ACP DISCUSS/DSCN MKR DOCD: CPT | Performed by: NUCLEAR MEDICINE

## 2021-12-28 PROCEDURE — 3017F COLORECTAL CA SCREEN DOC REV: CPT | Performed by: NUCLEAR MEDICINE

## 2021-12-28 PROCEDURE — G8484 FLU IMMUNIZE NO ADMIN: HCPCS | Performed by: NUCLEAR MEDICINE

## 2021-12-28 PROCEDURE — 4040F PNEUMOC VAC/ADMIN/RCVD: CPT | Performed by: NUCLEAR MEDICINE

## 2021-12-28 RX ORDER — FLECAINIDE ACETATE 100 MG/1
TABLET ORAL
Qty: 135 TABLET | Refills: 3 | Status: SHIPPED | OUTPATIENT
Start: 2021-12-28

## 2021-12-28 NOTE — PROGRESS NOTES
Pt here for MOHS Sx with Dr. Billy Arvizu 2/22/22  Need to hold xarelto 2 days prior and asa 7 days prior
24     HOURS 90 tablet 3    b complex vitamins capsule Take 1 capsule by mouth daily      aspirin 81 MG tablet Take 81 mg by mouth daily      Calcium Carb-Cholecalciferol (CALCIUM 1000 + D PO) Take by mouth      Fexofenadine HCl (MUCINEX ALLERGY PO) Take by mouth      Atorvastatin Calcium (LIPITOR PO) Take 20 mg by mouth daily.  FENOFIBRATE PO Take 54 mg by mouth daily.  atenolol (TENORMIN) 50 MG tablet Take 50 mg by mouth See Admin Instructions 50 in am and 25 in pm      guaiFENesin (MUCINEX) 600 MG SR tablet Take 600 mg by mouth every other day       Methylcellulose, Laxative, (CITRUCEL) 500 MG TABS Take 500 mg by mouth daily.  montelukast (SINGULAIR) 10 MG tablet Take 10 mg by mouth nightly.  therapeutic multivitamin-minerals (THERAGRAN-M) tablet Take 1 tablet by mouth daily Centrum      omeprazole (PRILOSEC) 20 MG capsule Take 20 mg by mouth every other day        No current facility-administered medications for this visit.      Allergies   Allergen Reactions    Celecoxib Hives    Ciprofloxacin      paralysis    Flagyl [Metronidazole]      paralysis    Sulfa Antibiotics Other (See Comments)     unsure     Health Maintenance   Topic Date Due    Hepatitis C screen  Never done    Colon cancer screen colonoscopy  Never done    Pneumococcal 65+ years Vaccine (2 of 2 - PPSV23) 11/03/2016    Lipid screen  11/12/2016    Annual Wellness Visit (AWV)  Never done    Potassium monitoring  08/07/2021    Creatinine monitoring  08/07/2021    DTaP/Tdap/Td vaccine (2 - Td or Tdap) 05/26/2030    Flu vaccine  Completed    Shingles Vaccine  Completed    COVID-19 Vaccine  Completed    Hepatitis A vaccine  Aged Out    Hepatitis B vaccine  Aged Out    Hib vaccine  Aged Out    Meningococcal (ACWY) vaccine  Aged Out       Subjective:  Review of Systems  General:   No fever, no chills, No fatigue or weight loss  Pulmonary:    No dyspnea, no wheezing  Cardiac:    Denies recent chest pain,

## 2022-01-03 RX ORDER — VALSARTAN AND HYDROCHLOROTHIAZIDE 320; 25 MG/1; MG/1
TABLET, FILM COATED ORAL
Qty: 90 TABLET | Refills: 3 | Status: SHIPPED | OUTPATIENT
Start: 2022-01-03

## 2022-08-16 ENCOUNTER — HOSPITAL ENCOUNTER (OUTPATIENT)
Dept: CT IMAGING | Age: 72
Discharge: HOME | End: 2022-08-16

## 2022-08-16 DIAGNOSIS — Z00.6 ENCOUNTER FOR EXAMINATION FOR NORMAL COMPARISON AND CONTROL IN CLINICAL RESEARCH PROGRAM: ICD-10-CM

## 2022-08-18 ENCOUNTER — TELEPHONE (OUTPATIENT)
Dept: CARDIOLOGY CLINIC | Age: 72
End: 2022-08-18

## 2022-08-18 NOTE — TELEPHONE ENCOUNTER
Pre op Risk Assessment    Procedure Colonoscopy  Physician [facility]  Date of surgery/procedure 9/27/2022    Last OV 12/28/2021  Last Stress Nothing in EPIC  Last Echo Nothing in EPIC  Last Cath Nothing in [facility]  Last Stent   Is patient on blood thinners Xarelto  Hold Meds/how many days

## 2022-08-22 ENCOUNTER — OFFICE VISIT (OUTPATIENT)
Dept: CARDIOTHORACIC SURGERY | Age: 72
End: 2022-08-22
Payer: MEDICARE

## 2022-08-22 VITALS
WEIGHT: 195.6 LBS | DIASTOLIC BLOOD PRESSURE: 80 MMHG | BODY MASS INDEX: 27.38 KG/M2 | SYSTOLIC BLOOD PRESSURE: 165 MMHG | HEART RATE: 48 BPM | OXYGEN SATURATION: 97 % | HEIGHT: 71 IN

## 2022-08-22 DIAGNOSIS — I71.40 ABDOMINAL AORTIC ANEURYSM (AAA) WITHOUT RUPTURE: Primary | ICD-10-CM

## 2022-08-22 PROCEDURE — G8417 CALC BMI ABV UP PARAM F/U: HCPCS | Performed by: PHYSICIAN ASSISTANT

## 2022-08-22 PROCEDURE — 1123F ACP DISCUSS/DSCN MKR DOCD: CPT | Performed by: PHYSICIAN ASSISTANT

## 2022-08-22 PROCEDURE — 99214 OFFICE O/P EST MOD 30 MIN: CPT | Performed by: PHYSICIAN ASSISTANT

## 2022-08-22 PROCEDURE — G8427 DOCREV CUR MEDS BY ELIG CLIN: HCPCS | Performed by: PHYSICIAN ASSISTANT

## 2022-08-22 PROCEDURE — 1036F TOBACCO NON-USER: CPT | Performed by: PHYSICIAN ASSISTANT

## 2022-08-22 PROCEDURE — 3017F COLORECTAL CA SCREEN DOC REV: CPT | Performed by: PHYSICIAN ASSISTANT

## 2022-08-22 NOTE — PATIENT INSTRUCTIONS
Monitor your BP daily-    Please call the primary cardiologist Dr. Keyla Laura if trends up.      Pernell Lopez
Yes

## 2022-08-22 NOTE — PROGRESS NOTES
Moderate fatty infiltration of the liver noted. The spleen is within   normal limits. Several benign cysts are noted in the liver unchanged the   pancreas is within normal limits. Post cholecystectomy status. No evidence of nephrolithiasis or obstructive uropathy. GI/Bowel: There is moderate stranding of the adipose tissue surrounding the   proximal sigmoid suggestive of diverticulitis no definite obvious abscess   formation. Follow-up examination suggested. The pericecal region is within   normal limits. No evidence of mechanical bowel obstruction. Pelvis: No evidence abnormal fluid within the pelvis. Peritoneum/Retroperitoneum: Aneurysmal dilatation of the abdominal aorta with   extensive arteriosclerotic change unchanged. Bones/Soft Tissues: Hypertrophic degenerative changes of the lumbar spine. IMPRESSION:   Diverticulitis proximal sigmoid with no obvious abscess formation. Aneurysmal dilatation the abdominal aorta unchanged cyndi infiltration of the   liver. D/T: 8/15/2022 5:36:10 PM / /     Interpreting Provider:   Electronically signed by Matt Quinn MD on 8/15/2022 5:40:44 PM     Patient: Vianey Heredia MRN: [id number] Date of Service: 8/15/2022     CTA Abd/Pelvis 8/13/21  1. Bilobed infrarenal abdominal aortic aneurysm. These are stable. The larger aneurysm has probable chronic short segment nonflow limiting dissection, the more inferior aneurysm demonstrates mural thrombus. 2. Findings suggesting emphysematous cystitis. PastMedical History:  Vicente Bruns  has a past medical history of Abdominal aneurysm ([address] Cancer ([address] Diverticulitis, Eosinophilic esophagitis, GERD (gastroesophageal reflux disease), Hyperlipidemia, Hypertension, and Unspecified sleep apnea. Past Surgical History:  The patient  has a past surgical history that includes Atrial ablation surgery (2000); Cholecystectomy (5-30-12); Skin cancer excision (2007);  Colonoscopy (2010 ); and Upper gastrointestinal endoscopy (2010). Allergies: The patient is allergic to celecoxib, ciprofloxacin, flagyl [metronidazole], and sulfa antibiotics. Medications:  Prior to Admission medications    Medication Sig Start Date End Date Taking? Authorizing Provider   valsartan-hydroCHLOROthiazide (DIOVAN-HCT) 320-25 MG per tablet TAKE 1 TABLET DAILY 1/3/22  Yes Darius Harrington MD   flecainide (TAMBOCOR) 100 MG tablet TAKE 1 TABLET EVERY MORNINGAND 1/2 TABLET EVERY       EVENING (THIS IS THE       CORRECT PRESCRIPTION) 12/28/21  Yes Darius Harrington MD   rivaroxaban (XARELTO) 20 MG TABS tablet TAKE 1 TABLET EVERY 24     HOURS 12/28/21  Yes Darius Harrington MD   Acetaminophen (TYLENOL PO) Take by mouth Extra strength at night   Yes Historical Provider, MD   aspirin 81 MG tablet Take 81 mg by mouth daily   Yes Historical Provider, MD   Calcium Carb-Cholecalciferol (CALCIUM 1000 + D PO) Take by mouth   Yes Historical Provider, MD   Fexofenadine HCl (MUCINEX ALLERGY PO) Take by mouth   Yes Historical Provider, MD   Atorvastatin Calcium (LIPITOR PO) Take 20 mg by mouth daily. Yes Historical Provider, MD   FENOFIBRATE PO Take 54 mg by mouth daily. Yes Historical Provider, MD   atenolol (TENORMIN) 50 MG tablet Take 50 mg by mouth See Admin Instructions 50 in am and 25 in pm   Yes Historical Provider, MD   guaiFENesin (MUCINEX) 600 MG SR tablet Take 600 mg by mouth every other day    Yes Historical Provider, MD   methylcellulose 500 MG TABS Take 500 mg by mouth daily. Yes Historical Provider, MD   montelukast (SINGULAIR) 10 MG tablet Take 10 mg by mouth nightly. Yes Historical Provider, MD   therapeutic multivitamin-minerals (THERAGRAN-M) tablet Take 1 tablet by mouth daily Centrum   Yes Historical Provider, MD   omeprazole (PRILOSEC) 20 MG capsule Take 20 mg by mouth every other day    Yes Historical Provider, MD       Family History:   This patient's family history includes Dementia in his mother; Heart Failure in his father; High Blood Pressure in his father. Social History:  Harish Marmolejo  reports that he has never smoked. He has never used smokeless tobacco. He reports current alcohol use. He reports that he does not use drugs. Vital Signs:   BP (!) 150/79   Pulse (!) 44   Ht 5' 11\" (1.803 m)   Wt 195 lb 9.6 oz (88.7 kg)   SpO2 97%   BMI 27.28 kg/m²     ROS:   Constitutional: Negative for activity change, chills, fatigue, fever and unexpected weight change. Respiratory: Negative for apnea, shortness of breath, wheezing and stridor. Cardiovascular: Negative for chest pain, palpitations and leg swelling. Gastrointestinal: Negative for hematochezia, melana, constipation, and N/V/D. Musculoskeletal: Negative for myalgias  Skin: Negative for color change, rash and wound. Neurological: Negative for dizziness or syncope. Physical Exam:  General appearance:  No acute distress, appears stated age and cooperative. Neck: No jugular venous distention. Trachea midline. No carotid bruits. Cardiovascular:  Regular rate and rhythm with normal S1/S2 without murmurs, rubs or gallops. Abdomen: Soft, non-tender, non-distended with normal bowel sounds. Ext: No clubbing, cyanosis or edema bilaterally. Full range of motion without deformity. Skin: Skin color, texture, turgor normal.  No rashes or lesions. Neurologic:  Neurovascularly intact without any focal sensory/motor deficits. Psychiatric:  Alert and oriented, thought content appropriate, normal insight.    Peripheral Pulses: +2 palpable, equal bilaterally     Labs:    CBC:  Lab Results   Component Value Date/Time    WBC 5.5 08/10/2020 11:20 AM    HGB 16.4 08/10/2020 11:20 AM    HCT 45.5 08/10/2020 11:20 AM    MCV 95.4 08/10/2020 11:20 AM     08/10/2020 11:20 AM    INR 1.05 08/10/2020 11:20 AM     BMP:   Lab Results   Component Value Date/Time     08/07/2020 02:40 PM    K 4.1 08/07/2020 02:40 PM    CL 99 08/07/2020 02:40 PM    CO2 27 08/07/2020 02:40 PM    PHOS 2.2 05/27/2012 12:49 PM    BUN 19 08/07/2020 02:40 PM    CREATININE 1.0 08/07/2020 02:40 PM    MG 2.1 05/31/2012 04:38 AM     Active Problem List  Patient Active Problem List   Diagnosis    Pancreatitis   * AAA - stable    Assessment:  1. AAA  -  Stable with repeat annual testing      Plan 8/22/22:  1) Continue current medical therapy. AAA has been stable and under 4 cm in size. We will monitor with ultrasound moving forward until size is larger than 4 cm. Issues discussed in detail with the patient, who understands and has no further questions. Time spent with patient: 30 minutes, of which more than 50% was spent counseling/coordinating the patient's care. Thank you for allowing us to be involved in the patient's care.     Electronically by Marcella Russell PA-C  on 8/22/2022 at 12:52 PM

## 2022-12-01 RX ORDER — FLECAINIDE ACETATE 100 MG/1
TABLET ORAL
Qty: 135 TABLET | Refills: 0 | Status: SHIPPED | OUTPATIENT
Start: 2022-12-01

## 2022-12-27 ENCOUNTER — OFFICE VISIT (OUTPATIENT)
Dept: CARDIOLOGY CLINIC | Age: 72
End: 2022-12-27
Payer: MEDICARE

## 2022-12-27 VITALS
HEART RATE: 48 BPM | BODY MASS INDEX: 28.34 KG/M2 | HEIGHT: 71 IN | DIASTOLIC BLOOD PRESSURE: 82 MMHG | SYSTOLIC BLOOD PRESSURE: 148 MMHG | WEIGHT: 202.4 LBS

## 2022-12-27 DIAGNOSIS — I48.0 PAROXYSMAL ATRIAL FIBRILLATION (HCC): Primary | ICD-10-CM

## 2022-12-27 DIAGNOSIS — I10 PRIMARY HYPERTENSION: ICD-10-CM

## 2022-12-27 PROCEDURE — 3078F DIAST BP <80 MM HG: CPT | Performed by: NUCLEAR MEDICINE

## 2022-12-27 PROCEDURE — 3017F COLORECTAL CA SCREEN DOC REV: CPT | Performed by: NUCLEAR MEDICINE

## 2022-12-27 PROCEDURE — G8427 DOCREV CUR MEDS BY ELIG CLIN: HCPCS | Performed by: NUCLEAR MEDICINE

## 2022-12-27 PROCEDURE — G8484 FLU IMMUNIZE NO ADMIN: HCPCS | Performed by: NUCLEAR MEDICINE

## 2022-12-27 PROCEDURE — 3074F SYST BP LT 130 MM HG: CPT | Performed by: NUCLEAR MEDICINE

## 2022-12-27 PROCEDURE — 1036F TOBACCO NON-USER: CPT | Performed by: NUCLEAR MEDICINE

## 2022-12-27 PROCEDURE — 99214 OFFICE O/P EST MOD 30 MIN: CPT | Performed by: NUCLEAR MEDICINE

## 2022-12-27 PROCEDURE — 1123F ACP DISCUSS/DSCN MKR DOCD: CPT | Performed by: NUCLEAR MEDICINE

## 2022-12-27 PROCEDURE — G8417 CALC BMI ABV UP PARAM F/U: HCPCS | Performed by: NUCLEAR MEDICINE

## 2022-12-27 RX ORDER — FLECAINIDE ACETATE 100 MG/1
TABLET ORAL
Qty: 135 TABLET | Refills: 3 | Status: SHIPPED | OUTPATIENT
Start: 2022-12-27

## 2022-12-27 RX ORDER — VALSARTAN AND HYDROCHLOROTHIAZIDE 320; 25 MG/1; MG/1
TABLET, FILM COATED ORAL
Qty: 90 TABLET | Refills: 3 | Status: SHIPPED | OUTPATIENT
Start: 2022-12-27

## 2022-12-27 NOTE — PROGRESS NOTES
[address] [address] [address]  [address] [facility] [place] [address]  Dept: [phone]  Dept Fax: [phone]  Loc: [phone]    Visit Date: 12/27/2022    Melvin Vann is a 67 y.o. male who presents todayfor:  Chief Complaint   Patient presents with    1 Year Follow Up    Hypertension    Atrial Fibrillation   Running lower HR at times  Known PAF  Some dizziness with it   No syncope  No chest pain  No changes in breathing  BP is stable   No bleeding issues  On xeralto       HPI:  HPI  Past Medical History:   Diagnosis Date    Abdominal aneurysm (HCC)     Dr. Zahra Concepcion [facility])     skin    Diverticulitis     Eosinophilic esophagitis     Dr. Annemarie Ortega    GERD (gastroesophageal reflux disease)     Hyperlipidemia     Hypertension     Unspecified sleep apnea       Past Surgical History:   Procedure Laterality Date    ATRIAL ABLATION SURGERY  2000    unsure of physician    CHOLECYSTECTOMY  5-30-12    Dr. Vicente Aguilar  2010     Dr. Mery Carballo  2007    Dr. Twin Johnson  2010    Dr. Annemarie Ortega     Family History   Problem Relation Age of Onset    Dementia Mother     High Blood Pressure Father     Heart Failure Father      Social History     Tobacco Use    Smoking status: Never    Smokeless tobacco: Never   Substance Use Topics    Alcohol use: Yes     Comment: shot of vodka--2 glasses of wine per night until getting sick      Current Outpatient Medications   Medication Sig Dispense Refill    flecainide (TAMBOCOR) 100 MG tablet TAKE 1 TABLET EVERY MORNINGAND 1/2 TABLET EVERY       EVENING (THIS IS THE       CORRECT PRESCRIPTION) 135 tablet 0    valsartan-hydroCHLOROthiazide (DIOVAN-HCT) 320-25 MG per tablet TAKE 1 TABLET DAILY 90 tablet 3    rivaroxaban (XARELTO) 20 MG TABS tablet TAKE 1 TABLET EVERY 24     HOURS 90 tablet 3    Acetaminophen (TYLENOL PO) Take by mouth Extra strength at night      aspirin 81 MG tablet Take 81 mg by mouth daily      Calcium Carb-Cholecalciferol (CALCIUM 1000 + D PO) Take by mouth      Fexofenadine HCl (MUCINEX ALLERGY PO) Take by mouth      Atorvastatin Calcium (LIPITOR PO) Take 20 mg by mouth daily. FENOFIBRATE PO Take 54 mg by mouth daily. atenolol (TENORMIN) 50 MG tablet Take 50 mg by mouth See Admin Instructions 50 in am and 25 in pm      guaiFENesin (MUCINEX) 600 MG SR tablet Take 600 mg by mouth every other day       methylcellulose 500 MG TABS Take 500 mg by mouth daily. montelukast (SINGULAIR) 10 MG tablet Take 10 mg by mouth nightly. therapeutic multivitamin-minerals (THERAGRAN-M) tablet Take 1 tablet by mouth daily Centrum      omeprazole (PRILOSEC) 20 MG capsule Take 20 mg by mouth every other day        No current facility-administered medications for this visit.      Allergies   Allergen Reactions    Celecoxib Hives    Ciprofloxacin      paralysis    Flagyl [Metronidazole]      paralysis    Sulfa Antibiotics Other (See Comments)     unsure     Health Maintenance   Topic Date Due    Depression Screen  Never done    Hepatitis C screen  Never done    Colorectal Cancer Screen  Never done    Pneumococcal 65+ years Vaccine (2 - PPSV23 if available, else PCV20) 11/03/2016    Lipids  11/12/2016    Annual Wellness Visit (AWV)  Never done    DTaP/Tdap/Td vaccine (2 - Td or Tdap) 05/26/2030    Flu vaccine  Completed    Shingles vaccine  Completed    COVID-19 Vaccine  Completed    Hepatitis A vaccine  Aged Out    Hib vaccine  Aged Out    Meningococcal (ACWY) vaccine  Aged Out       Subjective:  Review of Systems  General:   No fever, no chills, No fatigue or weight loss  Pulmonary:    some dyspnea, no wheezing  Cardiac:    Denies recent chest pain,   GI:     No nausea or vomiting, no abdominal pain  Neuro:    Nsome dizziness or light headedness,   Musculoskeletal:  No recent active issues  Extremities:   No edema, no obvious claudication     Objective:  Physical Exam  BP (!) 148/82   Pulse (!) 48   Ht 5' 11\" (1.803 m)   Wt 202 lb 6.4 oz (91.8 kg)   BMI 28.23 kg/m²   General:   Well developed, well nourished  Lungs:   Clear to auscultation  Heart:    Normal S1 S2, Slight murmur. no rubs, no gallops  Abdomen:   Soft, non tender, no organomegalies, positive bowel sounds  Extremities:   No edema, no cyanosis, good peripheral pulses  Neurological:   Awake, alert, oriented. No obvious focal deficits  Musculoskelatal:  No obvious deformities    Assessment:      Diagnosis Orders   1. Paroxysmal atrial fibrillation (HCC)        2. Primary hypertension        As above  Lower HR   Some symptoms with it       Plan:  No follow-ups on file. As above  Cut back on atenolol to 50 mg daily   Continue risk factor modification and medical management  Thank you for allowing me to participate in the care of your patient. Please don't hesitate to contact me regarding any further issues related to the patient care    Orders Placed:  No orders of the defined types were placed in this encounter. Medications Prescribed:  No orders of the defined types were placed in this encounter. Discussed use, benefit, and side effects of prescribed medications. All patient questions answered. Pt voicedunderstanding. Instructed to continue current medications, diet and exercise. Continue risk factor modification and medical management. Patient agreed with treatment plan. Follow up as directed.     Electronically signedby Adi Naylor MD on 12/27/2022 at 11:26 AM

## 2023-01-13 ENCOUNTER — TELEPHONE (OUTPATIENT)
Dept: CARDIOLOGY CLINIC | Age: 73
End: 2023-01-13

## 2023-01-13 ENCOUNTER — OFFICE VISIT (OUTPATIENT)
Dept: CARDIOLOGY CLINIC | Age: 73
End: 2023-01-13

## 2023-01-13 VITALS
SYSTOLIC BLOOD PRESSURE: 112 MMHG | DIASTOLIC BLOOD PRESSURE: 68 MMHG | WEIGHT: 204.4 LBS | HEART RATE: 98 BPM | BODY MASS INDEX: 28.61 KG/M2 | HEIGHT: 71 IN

## 2023-01-13 DIAGNOSIS — I10 PRIMARY HYPERTENSION: ICD-10-CM

## 2023-01-13 DIAGNOSIS — I48.0 PAROXYSMAL ATRIAL FIBRILLATION (HCC): Primary | ICD-10-CM

## 2023-01-13 NOTE — TELEPHONE ENCOUNTER
Pt called, his watch notified him of an elevated HR last evening, . Pt was just sitting watching TV. His HR is normally low. He went to [facility] ED. He was in Afib. States he hasn't been in Afib for years. He was discharged and instructed to check in with Dr Alta Kingston. On Xarelto and Flecainide. States just feels slightly tired,  this morning. Just saw Dr Alta Kingston 12/27. Per the patient, at that visit his  Atenolol was reduced from 50 in the am and 25 in the pm to just 50 daily. Called [facility] for EKG and ED discharge summary. Need to have Dr Alta Kingston look at EKG.

## 2023-01-13 NOTE — PROGRESS NOTES
[facility] [place] [facility]  [facility] CARDIOLOGY  [address]  [address]  [place] [address]  Dept: [phone]  Dept Fax: [phone]  Loc: [phone]    Visit Date: 1/13/2023    Zach Cornell is a 72 y.o. male who presents todayfor:  Chief Complaint   Patient presents with    Atrial Fibrillation    Hypertension   Back in A fib   We decreased the atenolol   Know PAF  No chest pain  No changes in breathing  Known A fib on xeralto       HPI:  HPI  Past Medical History:   Diagnosis Date    Abdominal aneurysm (HCC)     Dr. Fernandez    Cancer (HCC)     skin    Diverticulitis     Eosinophilic esophagitis     Dr. Gatica    GERD (gastroesophageal reflux disease)     Hyperlipidemia     Hypertension     Unspecified sleep apnea       Past Surgical History:   Procedure Laterality Date    ATRIAL ABLATION SURGERY  2000    unsure of physician    CHOLECYSTECTOMY  5-30-12    Dr. Renae     COLONOSCOPY  2010     Dr. Gatica     SKIN CANCER EXCISION  2007    Dr. Rose     UPPER GASTROINTESTINAL ENDOSCOPY  2010    Dr. Gatica     Family History   Problem Relation Age of Onset    Dementia Mother     High Blood Pressure Father     Heart Failure Father      Social History     Tobacco Use    Smoking status: Never    Smokeless tobacco: Never   Substance Use Topics    Alcohol use: Yes     Comment: shot of vodka--2 glasses of wine per night until getting sick      Current Outpatient Medications   Medication Sig Dispense Refill    valsartan-hydroCHLOROthiazide (DIOVAN-HCT) 320-25 MG per tablet TAKE 1 TABLET DAILY 90 tablet 3    rivaroxaban (XARELTO) 20 MG TABS tablet TAKE 1 TABLET EVERY 24     HOURS 90 tablet 3    flecainide (TAMBOCOR) 100 MG tablet TAKE 1 TABLET EVERY MORNINGAND 1/2 TABLET EVERY       EVENING (THIS IS THE       CORRECT PRESCRIPTION) 135 tablet 3    Acetaminophen (TYLENOL PO) Take by mouth Extra strength at night      aspirin 81 MG tablet Take 81 mg by mouth daily      Calcium  Carb-Cholecalciferol (CALCIUM 1000 + D PO) Take by mouth      Fexofenadine HCl (MUCINEX ALLERGY PO) Take by mouth      Atorvastatin Calcium (LIPITOR PO) Take 20 mg by mouth daily. FENOFIBRATE PO Take 54 mg by mouth daily. atenolol (TENORMIN) 50 MG tablet Take 50 mg by mouth See Admin Instructions 50 in am and 25 in pm      guaiFENesin (MUCINEX) 600 MG SR tablet Take 600 mg by mouth every other day       methylcellulose 500 MG TABS Take 500 mg by mouth daily. montelukast (SINGULAIR) 10 MG tablet Take 10 mg by mouth nightly. therapeutic multivitamin-minerals (THERAGRAN-M) tablet Take 1 tablet by mouth daily Centrum      omeprazole (PRILOSEC) 20 MG capsule Take 20 mg by mouth every other day        No current facility-administered medications for this visit.      Allergies   Allergen Reactions    Celecoxib Hives    Ciprofloxacin      paralysis    Flagyl [Metronidazole]      paralysis    Sulfa Antibiotics Other (See Comments)     unsure     Health Maintenance   Topic Date Due    Depression Screen  Never done    Hepatitis C screen  Never done    Colorectal Cancer Screen  Never done    Pneumococcal 65+ years Vaccine (2 - PPSV23 if available, else PCV20) 11/03/2016    Lipids  11/12/2016    Annual Wellness Visit (AWV)  Never done    DTaP/Tdap/Td vaccine (2 - Td or Tdap) 05/26/2030    Flu vaccine  Completed    Shingles vaccine  Completed    COVID-19 Vaccine  Completed    Hepatitis A vaccine  Aged Out    Hib vaccine  Aged Out    Meningococcal (ACWY) vaccine  Aged Out       Subjective:  Review of Systems  General:   No fever, no chills, No fatigue or weight loss  Pulmonary:    No dyspnea, no wheezing  Cardiac:    Denies recent chest pain,   GI:     No nausea or vomiting, no abdominal pain  Neuro:    No dizziness or light headedness,   Musculoskeletal:  No recent active issues  Extremities:   No edema, no obvious claudication     Objective:  Physical Exam  /68   Pulse 98   Ht 5' 11\" (1.803 m) Wt 204 lb 6.4 oz (92.7 kg)   BMI 28.51 kg/m²   General:   Well developed, well nourished  Lungs:   Clear to auscultation  Heart:    Normal S1 S2, Slight murmur. no rubs, no gallops  Abdomen:   Soft, non tender, no organomegalies, positive bowel sounds  Extremities:   No edema, no cyanosis, good peripheral pulses  Neurological:   Awake, alert, oriented. No obvious focal deficits  Musculoskelatal:  No obvious deformities    Assessment:      Diagnosis Orders   1. Paroxysmal atrial fibrillation (HCC)  EKG 12 lead      2. Primary hypertension        As above  Back in A fib     ECG in office was done today. I reviewed the ECG. No acute findings    Plan:  No follow-ups on file. Increase atenolol 50 am and 25 pm   If not back in rhythm we will cardiovert  Continue risk factor modification and medical management  Thank you for allowing me to participate in the care of your patient. Please don't hesitate to contact me regarding any further issues related to the patient care    Orders Placed:  Orders Placed This Encounter   Procedures    EKG 12 lead     Order Specific Question:   Reason for Exam?     Answer: Other       Medications Prescribed:  No orders of the defined types were placed in this encounter. Discussed use, benefit, and side effects of prescribed medications. All patient questions answered. Pt voicedunderstanding. Instructed to continue current medications, diet and exercise. Continue risk factor modification and medical management. Patient agreed with treatment plan. Follow up as directed.     Electronically signedby Adi Naylor MD on 1/13/2023 at 11:24 AM

## 2023-01-13 NOTE — TELEPHONE ENCOUNTER
Discussed with Dr Bonita Fowler. Pt will need to go back to Atenolol 50 am 25 pm.   Pt needs seen. There was a cancellation today at 1115. Pt notified. Will be here. Appt scheduled.

## 2023-01-16 ENCOUNTER — PREP FOR PROCEDURE (OUTPATIENT)
Dept: CARDIOLOGY | Age: 73
End: 2023-01-16

## 2023-01-16 ENCOUNTER — TELEPHONE (OUTPATIENT)
Dept: CARDIOLOGY CLINIC | Age: 73
End: 2023-01-16

## 2023-01-16 ENCOUNTER — NURSE ONLY (OUTPATIENT)
Dept: CARDIOLOGY CLINIC | Age: 73
End: 2023-01-16
Payer: MEDICARE

## 2023-01-16 VITALS — SYSTOLIC BLOOD PRESSURE: 122 MMHG | DIASTOLIC BLOOD PRESSURE: 62 MMHG | HEART RATE: 90 BPM

## 2023-01-16 DIAGNOSIS — I48.0 PAROXYSMAL ATRIAL FIBRILLATION (HCC): Primary | ICD-10-CM

## 2023-01-16 PROCEDURE — 93000 ELECTROCARDIOGRAM COMPLETE: CPT | Performed by: NUCLEAR MEDICINE

## 2023-01-16 RX ORDER — SODIUM CHLORIDE 0.9 % (FLUSH) 0.9 %
5-40 SYRINGE (ML) INJECTION PRN
Status: CANCELLED | OUTPATIENT
Start: 2023-01-16

## 2023-01-16 RX ORDER — SODIUM CHLORIDE 0.9 % (FLUSH) 0.9 %
5-40 SYRINGE (ML) INJECTION EVERY 12 HOURS SCHEDULED
Status: CANCELLED | OUTPATIENT
Start: 2023-01-16

## 2023-01-16 RX ORDER — SODIUM CHLORIDE 9 MG/ML
INJECTION, SOLUTION INTRAVENOUS PRN
Status: CANCELLED | OUTPATIENT
Start: 2023-01-16

## 2023-01-16 NOTE — PROGRESS NOTES
Dr. David Arias reviewed EKG. Schedule Cardioversion today or tomorrow. Patient notified. Per Dr. David Arias verbal order for patient to take 1/2 dose of Atenolol the morning of cardioversion.

## 2023-01-16 NOTE — TELEPHONE ENCOUNTER
Patient dropped off BP log. Patient scheduled for cardioversion tomorrow. Patient knows to take 1/2 dose of Atenolol the morning of cardioversion.

## 2023-01-17 ENCOUNTER — TELEPHONE (OUTPATIENT)
Dept: CARDIOLOGY CLINIC | Age: 73
End: 2023-01-17

## 2023-01-17 ENCOUNTER — HOSPITAL ENCOUNTER (OUTPATIENT)
Dept: INPATIENT UNIT | Age: 73
Discharge: HOME | End: 2023-01-17
Attending: NUCLEAR MEDICINE | Admitting: NUCLEAR MEDICINE
Payer: MEDICARE

## 2023-01-17 VITALS
WEIGHT: 195 LBS | HEIGHT: 70 IN | RESPIRATION RATE: 17 BRPM | BODY MASS INDEX: 27.92 KG/M2 | DIASTOLIC BLOOD PRESSURE: 72 MMHG | HEART RATE: 55 BPM | SYSTOLIC BLOOD PRESSURE: 117 MMHG | TEMPERATURE: 97.7 F | OXYGEN SATURATION: 97 %

## 2023-01-17 PROBLEM — I48.0 PAROXYSMAL ATRIAL FIBRILLATION (HCC): Status: ACTIVE | Noted: 2023-01-17

## 2023-01-17 LAB
ANION GAP SERPL CALCULATED.3IONS-SCNC: 17 MEQ/L (ref 8–16)
BUN BLDV-MCNC: 22 MG/DL (ref 7–22)
CALCIUM SERPL-MCNC: 9.1 MG/DL (ref 8.5–10.5)
CHLORIDE BLD-SCNC: 105 MEQ/L (ref 98–111)
CO2: 21 MEQ/L (ref 23–33)
CREAT SERPL-MCNC: 1.2 MG/DL (ref 0.4–1.2)
EKG ATRIAL RATE: 56 BPM
EKG P AXIS: 51 DEGREES
EKG P-R INTERVAL: 276 MS
EKG Q-T INTERVAL: 376 MS
EKG Q-T INTERVAL: 446 MS
EKG QRS DURATION: 106 MS
EKG QRS DURATION: 98 MS
EKG QTC CALCULATION (BAZETT): 430 MS
EKG QTC CALCULATION (BAZETT): 506 MS
EKG R AXIS: 103 DEGREES
EKG R AXIS: 77 DEGREES
EKG T AXIS: 12 DEGREES
EKG T AXIS: 34 DEGREES
EKG VENTRICULAR RATE: 109 BPM
EKG VENTRICULAR RATE: 56 BPM
ERYTHROCYTE [DISTWIDTH] IN BLOOD BY AUTOMATED COUNT: 13.5 % (ref 11.5–14.5)
ERYTHROCYTE [DISTWIDTH] IN BLOOD BY AUTOMATED COUNT: 45.4 FL (ref 35–45)
GFR SERPL CREATININE-BSD FRML MDRD: > 60 ML/MIN/1.73M2
GLUCOSE BLD-MCNC: 107 MG/DL (ref 70–108)
HCT VFR BLD CALC: 46 % (ref 42–52)
HEMOGLOBIN: 16.8 GM/DL (ref 14–18)
INR BLD: 2.55 (ref 0.85–1.13)
MCH RBC QN AUTO: 33.4 PG (ref 26–33)
MCHC RBC AUTO-ENTMCNC: 36.5 GM/DL (ref 32.2–35.5)
MCV RBC AUTO: 91.5 FL (ref 80–94)
PLATELET # BLD: 231 THOU/MM3 (ref 130–400)
PMV BLD AUTO: 10.3 FL (ref 9.4–12.4)
POTASSIUM SERPL-SCNC: 4 MEQ/L (ref 3.5–5.2)
RBC # BLD: 5.03 MILL/MM3 (ref 4.7–6.1)
SODIUM BLD-SCNC: 143 MEQ/L (ref 135–145)
WBC # BLD: 7 THOU/MM3 (ref 4.8–10.8)

## 2023-01-17 PROCEDURE — 6360000002 HC RX W HCPCS: Performed by: NUCLEAR MEDICINE

## 2023-01-17 PROCEDURE — 85610 PROTHROMBIN TIME: CPT

## 2023-01-17 PROCEDURE — 6360000002 HC RX W HCPCS

## 2023-01-17 PROCEDURE — 92960 CARDIOVERSION ELECTRIC EXT: CPT

## 2023-01-17 PROCEDURE — 36415 COLL VENOUS BLD VENIPUNCTURE: CPT

## 2023-01-17 PROCEDURE — 2580000003 HC RX 258: Performed by: NUCLEAR MEDICINE

## 2023-01-17 PROCEDURE — 85027 COMPLETE CBC AUTOMATED: CPT

## 2023-01-17 PROCEDURE — 93005 ELECTROCARDIOGRAM TRACING: CPT | Performed by: NUCLEAR MEDICINE

## 2023-01-17 PROCEDURE — 2500000003 HC RX 250 WO HCPCS: Performed by: NUCLEAR MEDICINE

## 2023-01-17 PROCEDURE — 93005 ELECTROCARDIOGRAM TRACING: CPT | Performed by: NURSE PRACTITIONER

## 2023-01-17 PROCEDURE — 2500000003 HC RX 250 WO HCPCS

## 2023-01-17 PROCEDURE — 80048 BASIC METABOLIC PNL TOTAL CA: CPT

## 2023-01-17 RX ORDER — FENTANYL CITRATE 50 UG/ML
INJECTION, SOLUTION INTRAMUSCULAR; INTRAVENOUS
Status: COMPLETED | OUTPATIENT
Start: 2023-01-17 | End: 2023-01-17

## 2023-01-17 RX ORDER — SODIUM CHLORIDE 9 MG/ML
INJECTION, SOLUTION INTRAVENOUS CONTINUOUS
Status: DISCONTINUED | OUTPATIENT
Start: 2023-01-17 | End: 2023-01-17 | Stop reason: HOSPADM

## 2023-01-17 RX ORDER — MIDAZOLAM HYDROCHLORIDE 1 MG/ML
INJECTION INTRAMUSCULAR; INTRAVENOUS
Status: COMPLETED | OUTPATIENT
Start: 2023-01-17 | End: 2023-01-17

## 2023-01-17 RX ORDER — NALOXONE HYDROCHLORIDE 0.4 MG/ML
INJECTION, SOLUTION INTRAMUSCULAR; INTRAVENOUS; SUBCUTANEOUS
Status: COMPLETED | OUTPATIENT
Start: 2023-01-17 | End: 2023-01-17

## 2023-01-17 RX ORDER — SODIUM CHLORIDE 0.9 % (FLUSH) 0.9 %
5-40 SYRINGE (ML) INJECTION EVERY 12 HOURS SCHEDULED
Status: DISCONTINUED | OUTPATIENT
Start: 2023-01-17 | End: 2023-01-17 | Stop reason: HOSPADM

## 2023-01-17 RX ORDER — SODIUM CHLORIDE 9 MG/ML
INJECTION, SOLUTION INTRAVENOUS PRN
Status: DISCONTINUED | OUTPATIENT
Start: 2023-01-17 | End: 2023-01-17 | Stop reason: HOSPADM

## 2023-01-17 RX ORDER — FLUMAZENIL 0.1 MG/ML
INJECTION INTRAVENOUS
Status: COMPLETED | OUTPATIENT
Start: 2023-01-17 | End: 2023-01-17

## 2023-01-17 RX ORDER — SODIUM CHLORIDE 0.9 % (FLUSH) 0.9 %
5-40 SYRINGE (ML) INJECTION PRN
Status: DISCONTINUED | OUTPATIENT
Start: 2023-01-17 | End: 2023-01-17 | Stop reason: HOSPADM

## 2023-01-17 RX ADMIN — NALOXONE HYDROCHLORIDE 0.4 MG: 0.4 INJECTION, SOLUTION INTRAMUSCULAR; INTRAVENOUS; SUBCUTANEOUS at 14:04

## 2023-01-17 RX ADMIN — SODIUM CHLORIDE: 9 INJECTION, SOLUTION INTRAVENOUS at 12:39

## 2023-01-17 RX ADMIN — FENTANYL CITRATE 50 MCG: 50 INJECTION, SOLUTION INTRAMUSCULAR; INTRAVENOUS at 14:01

## 2023-01-17 RX ADMIN — FLUMAZENIL 0.2 MG: 0.1 INJECTION INTRAVENOUS at 14:05

## 2023-01-17 RX ADMIN — MIDAZOLAM 2 MG: 1 INJECTION INTRAMUSCULAR; INTRAVENOUS at 14:01

## 2023-01-17 RX ADMIN — FENTANYL CITRATE 50 MCG: 50 INJECTION, SOLUTION INTRAMUSCULAR; INTRAVENOUS at 13:59

## 2023-01-17 RX ADMIN — MIDAZOLAM 2 MG: 1 INJECTION INTRAMUSCULAR; INTRAVENOUS at 13:59

## 2023-01-17 NOTE — FLOWSHEET NOTE
Ambulated in [place], NSR   Spoke to [place] in scheduling, states sent message for Dr Suzie Freeman staff to call pt with appointment time

## 2023-01-17 NOTE — TELEPHONE ENCOUNTER
Patient discharged 1/17/23 [facility] needs a 2 month follow up with dr Sly Alex. Please advise [phone]. Patient is seen n the [facility] office.

## 2023-01-17 NOTE — DISCHARGE INSTRUCTIONS
Electrical Cardioversion: What to Expect at [facility]     Electrical cardioversion is a treatment for an abnormal heartbeat, such as atrial fibrillation, supraventricular tachycardia, or ventricular tachycardia (VT). Your doctor used a brief electrical shock to reset your heart's rhythm. After the procedure, you may have redness, like a sunburn, where the patches were. The medicines you got to make you sleepy may make you feel drowsy for the rest of the day. You may feel soreness or discomfort in your chest wall for a few days. Your doctor may have you take medicines to help the heart beat normally and to prevent blood clots. This care sheet gives you a general idea about how long it will take for you to recover. But each person recovers at a different pace. Follow the steps below to feel better as quickly as possible. How can you care for yourself at home? Medicines    If the doctor gave you a sedative: For 24 hours, don't do anything that requires attention to detail. It takes time for the medicine's effects to completely wear off. For your safety, do not drive or operate any machinery that could be dangerous. Wait until the medicine wears off and you can think clearly and react easily. Be safe with medicines. Take your medicines exactly as prescribed. Call your doctor if you think you are having a problem with your medicine. You may take one or more of the following medicines:  Rate-control medicines to slow the heart rate. Rhythm-control medicines that help the heart keep a normal rhythm. Blood thinners, also called anticoagulants, which help prevent blood clots. You will get more details on the specific medicines your doctor prescribes. Be sure you know how to take your medicines safely.      Do not take any vitamins, over-the-counter medicines, or herbal products without talking to your doctor first.   Exercise    Talk to your doctor about what type and level of exercise are safe for you.     When you exercise, watch for signs that your heart is working too hard. You are pushing too hard if you cannot talk while you are exercising. If you become short of breath or dizzy or have chest pain, sit down and rest right away. Check your pulse regularly. Place two fingers on the artery at the palm side of your wrist in line with your thumb. If your heartbeat seems uneven or fast, talk to your doctor. Heart-healthy lifestyle    Do not smoke. If you need help quitting, talk to your doctor about stop-smoking programs and medicines. These can increase your chances of quitting for good. Eat heart-healthy foods. Limit sodium, alcohol, and sugar. Stay at a healthy weight. Lose weight if you need to. Manage other health problems. If you think you may have a problem with alcohol or drug use, talk to your doctor. Follow-up care is a key part of your treatment and safety. Be sure to make and go to all appointments, and call your doctor if you are having problems. It's also a good idea to know your test results and keep a list of the medicines you take. When should you call for help? Call 911 anytime you think you may need emergency care. For example, call if:    You passed out (lost consciousness). You have symptoms of a heart attack. These may include:  Chest pain or pressure, or a strange feeling in the chest.  Sweating. Shortness of breath. Nausea or vomiting. Pain, pressure, or a strange feeling in the back, neck, jaw, or upper belly or in one or both shoulders or arms. Lightheadedness or sudden weakness. A fast or irregular heartbeat. After calling 911, the  may tell you to chew 1 adult-strength or 2 to 4 low-dose aspirin. Wait for an ambulance. Do not try to drive yourself. You have symptoms of a stroke. These may include:  Sudden numbness, tingling, weakness, or loss of movement in your face, arm, or leg, especially on only one side of your body.   Sudden vision changes. Sudden trouble speaking. Sudden confusion or trouble understanding simple statements. Sudden problems with walking or balance. A sudden, severe headache that is different from past headaches. Call your doctor now or seek immediate medical care if:    You feel dizzy or lightheaded, or you feel like you may faint. You have a fast or irregular heartbeat. Watch closely for any changes in your health, and be sure to contact your doctor if you have any problems. Where can you learn more? Go to http://www.woods.com/ and enter A617 to learn more about \"Electrical Cardioversion: What to Expect at Home. \"  Current as of: September 7, 2022               Content Version: 13.5  © 2006-2022 [employer]. Care instructions adapted under license by [facility] ([facility]). If you have questions about a medical condition or this instruction, always ask your healthcare professional. [place] [address] any warranty or liability for your use of this information.

## 2023-01-17 NOTE — PROGRESS NOTES
1200  Pt admitted to  2E17 ambulatory for Cardioversion. Pt NPO. Patient accompanied by spouse, Viviane Brennan. Vital signs obtained. Assessment and data collection initiated. Oriented to room. Policies and procedures for 2E explained   All questions answered with no further questions at this time. Fall prevention and safety precautions discussed with patient. Care plan reviewed with patient and spouse. Patient and spouse verbalize understanding of the plan of care and contribute to goal setting.

## 2023-01-17 NOTE — H&P
[facility]  [facility]ed[facility]Analgesia History & Physical    Pt Name: Chio Wolf  Account number: [de-identified]  MRN: [id number]  YOB: 1950  Provider Performing Procedure: Mary Kate Sanchez MD MD [facility]  Primary Care Physician: Italo Canales MD  Date: 1/17/2023    PRE-PROCEDURE    Code Status: FULL CODE  Brief History/Pre-Procedure Diagnosis:   A fib      Consent: : I have discussed with the patient risks, benefits, and alternatives (and relevant risks, benefits, and side effects related to alternatives or not receiving care), and likelihood of the success. The patient and/or representative appear to understand and agree to proceed. The discussion encompasses risks, benefits, and side effects related to the alternatives and the risks related to not receiving the proposed care, treatment, and services. MEDICAL HISTORY  []ASHD/ANGINA/MI/CHF   [x]Hypertension  []Diabetes  []Hyperlipidemia  []Smoking  []Family Hx of ASHD  []Additional information:       has a past medical history of Abdominal aneurysm, Cancer ([facility] [address] Diverticulitis, Eosinophilic esophagitis, GERD (gastroesophageal reflux disease), Hyperlipidemia, Hypertension, and Unspecified sleep apnea. SURGICAL HISTORY   has a past surgical history that includes Atrial ablation surgery (2000); Cholecystectomy (5-30-12); Skin cancer excision (2007); Colonoscopy (2010 ); and Upper gastrointestinal endoscopy (2010).   Additional information:       ALLERGIES   Allergies as of 01/17/2023 - Fully Reviewed 01/17/2023   Allergen Reaction Noted    Celecoxib Hives 06/06/2012    Ciprofloxacin  11/26/2019    Flagyl [metronidazole]  11/26/2019    Sulfa antibiotics Other (See Comments) 06/06/2012     Additional information:       MEDICATIONS   Aspirin  [x] 81 mg  [] 325 mg  [] None  Antiplatelet drug therapy use last 5 days  []No []Yes  Coumadin Use Last 5 Days []No []Yes  Other anticoagulant use last 5 days  []No []Yes    Current Facility-Administered Medications:     sodium chloride flush 0.9 % injection 5-40 mL, 5-40 mL, IntraVENous, 2 times per day, Donivan Grist, APRN - CNP    sodium chloride flush 0.9 % injection 5-40 mL, 5-40 mL, IntraVENous, PRN, Donivan Grist, APRN - CNP    0.9 % sodium chloride infusion, , IntraVENous, PRN, Donivan Grist, APRN - CNP    0.9 % sodium chloride infusion, , IntraVENous, Continuous, Pelon Su MD, Last Rate: 75 mL/hr at 01/17/23 1239, New Bag at 01/17/23 1239  Prior to Admission medications    Medication Sig Start Date End Date Taking? Authorizing Provider   valsartan-hydroCHLOROthiazide (DIOVAN-HCT) 320-25 MG per tablet TAKE 1 TABLET DAILY 12/27/22   Pelon Oconnell MD   rivaroxaban (XARELTO) 20 MG TABS tablet TAKE 1 TABLET EVERY 24     HOURS 12/27/22   Sonya Bains MD   flecainide (TAMBOCOR) 100 MG tablet TAKE 1 TABLET EVERY MORNINGAND 1/2 TABLET EVERY       EVENING (THIS IS THE       CORRECT PRESCRIPTION) 12/27/22   Sonya Bains MD   Acetaminophen (TYLENOL PO) Take by mouth Extra strength at night    Historical Provider, MD   aspirin 81 MG tablet Take 81 mg by mouth daily    Historical Provider, MD   Calcium Carb-Cholecalciferol (CALCIUM 1000 + D PO) Take by mouth    Historical Provider, MD   Fexofenadine HCl (MUCINEX ALLERGY PO) Take by mouth    Historical Provider, MD   Atorvastatin Calcium (LIPITOR PO) Take 20 mg by mouth daily. Historical Provider, MD   FENOFIBRATE PO Take 54 mg by mouth daily. Historical Provider, MD   atenolol (TENORMIN) 50 MG tablet Take 50 mg by mouth See Admin Instructions 50 in am and 25 in pm    Historical Provider, MD   guaiFENesin (MUCINEX) 600 MG SR tablet Take 600 mg by mouth every other day     Historical Provider, MD   methylcellulose 500 MG TABS Take 500 mg by mouth daily. Historical Provider, MD   montelukast (SINGULAIR) 10 MG tablet Take 10 mg by mouth nightly.       Historical Provider, MD   therapeutic multivitamin-minerals (THERAGRAN-M) tablet Take 1 tablet by mouth daily Centrum    Historical Provider, MD   omeprazole (PRILOSEC) 20 MG capsule Take 20 mg by mouth every other day     Historical Provider, MD     Additional information:       VITAL SIGNS   Vitals:    01/17/23 1230   BP: 126/85   Pulse: 100   Resp: 18   Temp: 97.7 °F (36.5 °C)   SpO2: 98%       PHYSICAL:   General: No acute distress  HEENT:  Unremarkable for age  Neck: without increased JVD, carotid pulses 2+ bilaterally without bruits  Heart: RRR, S1 & S2 WNL, S4 gallop, without murmurs or rubs    Lungs: Clear to auscultation    Abdomen: BS present, without HSM, masses, or tenderness    Extremities: without C,C,E.  Pulses 2+ bilaterally  Mental Status: Alert & Oriented        PLANNED PROCEDURE   []Cath  []PCI                []Pacemaker/AICD  []ANIKET             [x]Cardioversion []Peripheral angiography/PTA  []Other:      SEDATION  Planned agent:[x]Midazolam []Meperidine [x]Sublimaze []Morphine  []Diazepam  []Other:     ASA Classification:  []1 [x]2 []3 []4 []5  Class 1: A normal healthy patient  Class 2: Pt with mild to moderate systemic disease  Class 3: Severe systemic disease or disturbance  Class 4: Severe systemic disorders that are already life threatening. Class 5: Moribund pt with little chances of survival, for more than 24 hours. Mallampati I Airway Classification:   []1 [x]2 []3 []4    [x]Pre-procedure diagnostic studies complete and results available. Comment:    [x]Previous sedation/anesthesia experiences assessed. Comment:    [x]The patient is an appropriate candidate to undergo the planned procedure sedation and anesthesia. (Refer to nursing sedation/analgesia documentation record)  [x]Formulation and discussion of sedation/procedure plan, risks, and expectations with patient and/or responsible adult completed. [x]Patient examined immediately prior to the procedure.  (Refer to nursing sedation/analgesia documentation record)    Pelon Su MD MD FACC  Electronically signed 1/17/2023 at 1:31 PM

## 2023-01-17 NOTE — PLAN OF CARE
1600  discharge instructions given to pt and wife, both \"voiced understanding\"    [phone]  discharged per wc per transport team with personal belongings with no needs   NSR

## 2023-01-17 NOTE — TELEPHONE ENCOUNTER
We have no opening in [facility], pt will have to come to [facility] for 2 month appt, then we can get him back to [facility].  APPt made, pt is still in the hospital. Left detailed message for pt on appt time and day

## 2023-01-17 NOTE — SEDATION DOCUMENTATION
Cath lab staff in room to prep patient for Cardioversion with Dr. Schroeder. Patient identified with two identifiers. All special equipment in room. Consents verified. Dr. Schroeder paged.

## 2023-03-30 ENCOUNTER — OFFICE VISIT (OUTPATIENT)
Dept: CARDIOLOGY CLINIC | Age: 73
End: 2023-03-30

## 2023-03-30 VITALS
DIASTOLIC BLOOD PRESSURE: 86 MMHG | HEIGHT: 70 IN | HEART RATE: 58 BPM | SYSTOLIC BLOOD PRESSURE: 162 MMHG | WEIGHT: 207 LBS | BODY MASS INDEX: 29.63 KG/M2

## 2023-03-30 DIAGNOSIS — I10 PRIMARY HYPERTENSION: ICD-10-CM

## 2023-03-30 DIAGNOSIS — I48.0 PAROXYSMAL ATRIAL FIBRILLATION (HCC): Primary | ICD-10-CM

## 2023-03-30 DIAGNOSIS — Z92.89 HISTORY OF CARDIOVERSION: ICD-10-CM

## 2023-03-30 NOTE — PROGRESS NOTES
Patient here for follow up after cardioversion. EKG done today. Patient denies any cardiac symptoms.

## 2023-03-30 NOTE — PROGRESS NOTES
[address]  [facility] [address]  Dept: [phone]  Dept Fax: [phone]  Loc: [phone]    Visit Date: 3/30/2023    Jennie Bagley is a 67 y.o. male who presents todayfor:  Chief Complaint   Patient presents with    Check-Up    Atrial Fibrillation    Hypertension   Cardioverted again   Runs lower HR   We were trying to cut back on beta blockers  No dizziness  No syncope  Bp is stable   Higher today       HPI:  HPI  Past Medical History:   Diagnosis Date    Abdominal aneurysm     Dr. Kristen M[facility]quez [facility])     skin    Diverticulitis     Eosinophilic esophagitis     Dr. Nicolas Saez    GERD (gastroesophageal reflux disease)     Hyperlipidemia     Hypertension     Unspecified sleep apnea       Past Surgical History:   Procedure Laterality Date    ATRIAL ABLATION SURGERY  2000    unsure of physician    CHOLECYSTECTOMY  5-30-12    Dr. Cookie Chin  2010     Dr. Jayda Orr  2007    Dr. Yanet Pete  2010    Dr. Nicolas Saez     Family History   Problem Relation Age of Onset    Dementia Mother     High Blood Pressure Father     Heart Failure Father      Social History     Tobacco Use    Smoking status: Never    Smokeless tobacco: Never   Substance Use Topics    Alcohol use: Yes     Comment: shot of vodka--2 glasses of wine per night until getting sick      Current Outpatient Medications   Medication Sig Dispense Refill    B COMPLEX-C-FOLIC ACID PO Take by mouth      Multiple Vitamins-Minerals (OCUVITE PO) Take by mouth      valsartan-hydroCHLOROthiazide (DIOVAN-HCT) 320-25 MG per tablet TAKE 1 TABLET DAILY 90 tablet 3    rivaroxaban (XARELTO) 20 MG TABS tablet TAKE 1 TABLET EVERY 24     HOURS 90 tablet 3    flecainide (TAMBOCOR) 100 MG tablet TAKE 1 TABLET EVERY MORNINGAND 1/2 TABLET EVERY       EVENING (THIS IS THE       CORRECT PRESCRIPTION) 135 tablet 3    Acetaminophen

## 2023-06-08 ENCOUNTER — TELEPHONE (OUTPATIENT)
Dept: CARDIOLOGY CLINIC | Age: 73
End: 2023-06-08

## 2023-06-08 ENCOUNTER — NURSE ONLY (OUTPATIENT)
Dept: CARDIOLOGY CLINIC | Age: 73
End: 2023-06-08
Payer: MEDICARE

## 2023-06-08 VITALS
DIASTOLIC BLOOD PRESSURE: 62 MMHG | WEIGHT: 202.6 LBS | HEIGHT: 70 IN | HEART RATE: 97 BPM | BODY MASS INDEX: 29.01 KG/M2 | SYSTOLIC BLOOD PRESSURE: 126 MMHG

## 2023-06-08 DIAGNOSIS — I10 PRIMARY HYPERTENSION: ICD-10-CM

## 2023-06-08 DIAGNOSIS — I48.0 PAROXYSMAL ATRIAL FIBRILLATION (HCC): Primary | ICD-10-CM

## 2023-06-08 PROCEDURE — 3074F SYST BP LT 130 MM HG: CPT | Performed by: NUCLEAR MEDICINE

## 2023-06-08 PROCEDURE — 1123F ACP DISCUSS/DSCN MKR DOCD: CPT | Performed by: NUCLEAR MEDICINE

## 2023-06-08 PROCEDURE — 99214 OFFICE O/P EST MOD 30 MIN: CPT | Performed by: NUCLEAR MEDICINE

## 2023-06-08 PROCEDURE — 93000 ELECTROCARDIOGRAM COMPLETE: CPT | Performed by: NUCLEAR MEDICINE

## 2023-06-08 PROCEDURE — 3078F DIAST BP <80 MM HG: CPT | Performed by: NUCLEAR MEDICINE

## 2023-06-08 NOTE — PROGRESS NOTES
Needs cardiac clearance for MOHS with Dr. Alem Levine. EKG done today. Patient denies any cardiac symptoms. His watched showed he was out of rhythm.

## 2023-06-08 NOTE — PROGRESS NOTES
tablet 3    Acetaminophen (TYLENOL PO) Take by mouth Extra strength at night      aspirin 81 MG tablet Take 1 tablet by mouth daily      Calcium Carb-Cholecalciferol (CALCIUM 1000 + D PO) Take by mouth      Fexofenadine HCl (MUCINEX ALLERGY PO) Take by mouth      Atorvastatin Calcium (LIPITOR PO) Take 20 mg by mouth daily. FENOFIBRATE PO Take 54 mg by mouth daily. atenolol (TENORMIN) 50 MG tablet Take 1 tablet by mouth See Admin Instructions 50 in am and 25 in pm      guaiFENesin (MUCINEX) 600 MG SR tablet Take 1 tablet by mouth every other day      methylcellulose 500 MG TABS Take 1 tablet by mouth daily      montelukast (SINGULAIR) 10 MG tablet Take 1 tablet by mouth nightly      therapeutic multivitamin-minerals (THERAGRAN-M) tablet Take 1 tablet by mouth daily Centrum      omeprazole (PRILOSEC) 20 MG capsule Take 1 capsule by mouth every other day       No current facility-administered medications for this visit.      Allergies   Allergen Reactions    Celecoxib Hives    Ciprofloxacin      paralysis    Flagyl [Metronidazole]      paralysis    Sulfa Antibiotics Other (See Comments)     unsure     Health Maintenance   Topic Date Due    Depression Screen  Never done    Hepatitis C screen  Never done    Colorectal Cancer Screen  Never done    Pneumococcal 65+ years Vaccine (2 - PPSV23 if available, else PCV20) 11/03/2016    Lipids  11/12/2016    Annual Wellness Visit (AWV)  Never done    COVID-19 Vaccine (6 - Booster for [place] series) 01/12/2023    DTaP/Tdap/Td vaccine (2 - Td or Tdap) 05/26/2030    Flu vaccine  Completed    Shingles vaccine  Completed    Hepatitis A vaccine  Aged Out    Hib vaccine  Aged Out    Meningococcal (ACWY) vaccine  Aged Out       Subjective:  General:   No fever, no chills, No fatigue or weight loss  Pulmonary:    some dyspnea, no wheezing  Cardiac:    Denies recent chest pain,   GI:     No nausea or vomiting, no abdominal pain  Neuro:     No dizziness or light headedness,

## 2023-06-08 NOTE — TELEPHONE ENCOUNTER
Bruno Garcias is calling to get advice about his heart being out of rhythm, it was out in Jan, [place] put it back in, but out again, per his watch device, he cant feel it out of rhythm. Does he Baki to put back in rhythm or wait.

## 2023-07-07 RX ORDER — FENOFIBRATE 54 MG/1
54 TABLET ORAL DAILY
COMMUNITY

## 2023-07-07 RX ORDER — ATORVASTATIN CALCIUM 20 MG/1
20 TABLET, FILM COATED ORAL DAILY
COMMUNITY

## 2023-07-11 NOTE — PROGRESS NOTES
Dr. Ebonie Russell cleared as low to moderate risk, information given to anesthesia.  Per Dr. Macario Montes ok to proceed at the surgery center 7/14

## 2023-07-14 ENCOUNTER — ANESTHESIA EVENT (OUTPATIENT)
Dept: OPERATING ROOM | Age: 73
End: 2023-07-14
Payer: MEDICARE

## 2023-07-14 ENCOUNTER — ANESTHESIA (OUTPATIENT)
Dept: OPERATING ROOM | Age: 73
End: 2023-07-14
Payer: MEDICARE

## 2023-07-14 ENCOUNTER — HOSPITAL ENCOUNTER (OUTPATIENT)
Age: 73
Setting detail: OUTPATIENT SURGERY
Discharge: HOME | End: 2023-07-14
Attending: SPECIALIST | Admitting: SPECIALIST
Payer: MEDICARE

## 2023-07-14 VITALS
RESPIRATION RATE: 16 BRPM | HEIGHT: 70 IN | BODY MASS INDEX: 28.4 KG/M2 | HEART RATE: 86 BPM | WEIGHT: 198.4 LBS | TEMPERATURE: 96.6 F | OXYGEN SATURATION: 94 % | SYSTOLIC BLOOD PRESSURE: 120 MMHG | DIASTOLIC BLOOD PRESSURE: 71 MMHG

## 2023-07-14 DIAGNOSIS — C44.42 SQUAMOUS CELL CARCINOMA OF SCALP: Primary | ICD-10-CM

## 2023-07-14 PROCEDURE — 6360000002 HC RX W HCPCS: Performed by: NURSE ANESTHETIST, CERTIFIED REGISTERED

## 2023-07-14 PROCEDURE — 7100000011 HC PHASE II RECOVERY - ADDTL 15 MIN: Performed by: SPECIALIST

## 2023-07-14 PROCEDURE — 6360000002 HC RX W HCPCS: Performed by: SPECIALIST

## 2023-07-14 PROCEDURE — 3700000000 HC ANESTHESIA ATTENDED CARE: Performed by: SPECIALIST

## 2023-07-14 PROCEDURE — 2580000003 HC RX 258: Performed by: SPECIALIST

## 2023-07-14 PROCEDURE — 2709999900 HC NON-CHARGEABLE SUPPLY: Performed by: SPECIALIST

## 2023-07-14 PROCEDURE — 3600000002 HC SURGERY LEVEL 2 BASE: Performed by: SPECIALIST

## 2023-07-14 PROCEDURE — 3700000001 HC ADD 15 MINUTES (ANESTHESIA): Performed by: SPECIALIST

## 2023-07-14 PROCEDURE — 3600000012 HC SURGERY LEVEL 2 ADDTL 15MIN: Performed by: SPECIALIST

## 2023-07-14 PROCEDURE — 2500000003 HC RX 250 WO HCPCS: Performed by: SPECIALIST

## 2023-07-14 PROCEDURE — 7100000010 HC PHASE II RECOVERY - FIRST 15 MIN: Performed by: SPECIALIST

## 2023-07-14 RX ORDER — TRAMADOL HYDROCHLORIDE 50 MG/1
50 TABLET ORAL EVERY 6 HOURS PRN
Qty: 12 TABLET | Refills: 0 | Status: SHIPPED | OUTPATIENT
Start: 2023-07-14 | End: 2023-07-17

## 2023-07-14 RX ORDER — SODIUM CHLORIDE 9 MG/ML
INJECTION, SOLUTION INTRAVENOUS CONTINUOUS
Status: DISCONTINUED | OUTPATIENT
Start: 2023-07-14 | End: 2023-07-14 | Stop reason: HOSPADM

## 2023-07-14 RX ORDER — LIDOCAINE HYDROCHLORIDE AND EPINEPHRINE 10; 10 MG/ML; UG/ML
INJECTION, SOLUTION INFILTRATION; PERINEURAL PRN
Status: DISCONTINUED | OUTPATIENT
Start: 2023-07-14 | End: 2023-07-14 | Stop reason: ALTCHOICE

## 2023-07-14 RX ORDER — PROPOFOL 10 MG/ML
INJECTION, EMULSION INTRAVENOUS PRN
Status: DISCONTINUED | OUTPATIENT
Start: 2023-07-14 | End: 2023-07-14 | Stop reason: SDUPTHER

## 2023-07-14 RX ADMIN — PROPOFOL 50 MG: 10 INJECTION, EMULSION INTRAVENOUS at 11:27

## 2023-07-14 RX ADMIN — PROPOFOL 50 MG: 10 INJECTION, EMULSION INTRAVENOUS at 11:12

## 2023-07-14 RX ADMIN — Medication 2000 MG: at 11:02

## 2023-07-14 RX ADMIN — PROPOFOL 60 MG: 10 INJECTION, EMULSION INTRAVENOUS at 11:09

## 2023-07-14 RX ADMIN — PROPOFOL 30 MG: 10 INJECTION, EMULSION INTRAVENOUS at 11:32

## 2023-07-14 RX ADMIN — SODIUM CHLORIDE: 9 INJECTION, SOLUTION INTRAVENOUS at 11:01

## 2023-07-14 RX ADMIN — PROPOFOL 20 MG: 10 INJECTION, EMULSION INTRAVENOUS at 11:40

## 2023-07-14 RX ADMIN — PROPOFOL 40 MG: 10 INJECTION, EMULSION INTRAVENOUS at 11:02

## 2023-07-14 ASSESSMENT — PAIN - FUNCTIONAL ASSESSMENT: PAIN_FUNCTIONAL_ASSESSMENT: 0-10

## 2023-07-14 NOTE — PROGRESS NOTES
1153 To recovery viacat. Spont resp. VSS. Report received from surgical rn. IV infusing Valerie Goods. Head wrap in place clean and intact. Incision to upper left chest clean and intact with skin glue. Snack and drink given. Call bell in reach.  at side  1230 Discharge instructions given to pt and wife with each voicing understanding. 1235 IV discontinued.  Up to dress self  [phone] Discharge to home in stable ambulatory condition with wife

## 2023-07-14 NOTE — ANESTHESIA PRE PROCEDURE
Department of Anesthesiology  Preprocedure Note       Name:  Fawn Potter   Age:  67 y.o.  :  1950                                          MRN:  [id number]         Date:  2023      Surgeon: Reba Hernandez):  Ronny Grullon MD    Procedure: Procedure(s):  MOHS defect repair SCC Left Frontal Scalp    Medications prior to admission:   Prior to Admission medications    Medication Sig Start Date End Date Taking?  Authorizing Provider   atorvastatin (LIPITOR) 20 MG tablet Take 1 tablet by mouth daily   Yes Historical Provider, MD   fenofibrate (TRICOR) 54 MG tablet Take 1 tablet by mouth daily   Yes Historical Provider, MD   Multiple Vitamins-Minerals (OCUVITE PO) Take by mouth daily    Historical Provider, MD   valsartan-hydroCHLOROthiazide (DIOVAN-HCT) 320-25 MG per tablet TAKE 1 TABLET DAILY 22   Jonathan Rendon, MD   rivaroxaban (XARELTO) 20 MG TABS tablet TAKE 1 TABLET EVERY 24     HOURS 22   Jonathan Rendon, MD   flecainide (TAMBOCOR) 100 MG tablet TAKE 1 TABLET EVERY MORNINGAND 1/2 TABLET EVERY       EVENING (THIS IS THE       CORRECT PRESCRIPTION) 22   Jonathan Rendon, MD   Acetaminophen (TYLENOL PO) Take by mouth Extra strength at night    Historical Provider, MD   aspirin 81 MG tablet Take 1 tablet by mouth daily    Historical Provider, MD   Calcium Carb-Cholecalciferol (CALCIUM 1000 + D PO) Take by mouth daily    Historical Provider, MD   atenolol (TENORMIN) 50 MG tablet Take by mouth 1 tab in am and 1/2 tab in pm    Historical Provider, MD   guaiFENesin (MUCINEX) 600 MG SR tablet Take 1 tablet by mouth every other day    Historical Provider, MD   montelukast (SINGULAIR) 10 MG tablet Take 1 tablet by mouth daily    Historical Provider, MD   therapeutic multivitamin-minerals (THERAGRAN-M) tablet Take 1 tablet by mouth daily Centrum    Historical Provider, MD   omeprazole (PRILOSEC) 20 MG capsule Take 1 capsule by mouth every other day    Historical Provider, MD

## 2023-07-14 NOTE — OP NOTE
scalp [D04.4]    Post-Op Diagnosis: Same       Procedure(s):  MOHS defect repair SCC Left Frontal Scalp    Surgeon(s):  Maia Ruffin MD    Assistant:   Physician Assistant: Nida Holman PA-C    Anesthesia: Monitor Anesthesia Care    Estimated Blood Loss (mL): Minimal    Complications: None    Specimens:   * No specimens in log *    Implants:  * No implants in log *      Drains: * No LDAs found *    Findings: 5cm2 defect of left frontal scalp s/p MOHS For squamous cell carcinoma          Detailed Description of Procedure:   Full thickness skin graft (5 cm2) repair of left frontal scalp defect (CPT 84291)    Electronically signed by Maia Ruffin MD on 7/14/2023 at 11:45 AM

## 2023-07-14 NOTE — ANESTHESIA POSTPROCEDURE EVALUATION
Department of Anesthesiology  Postprocedure Note    Patient: Rere Saini  MRN: [id number]  YOB: 1950  Date of evaluation: 7/14/2023      Procedure Summary     Date: 07/14/23 Room / Location: [facility] OR 01 / [facility]    Anesthesia Start: 1100 Anesthesia Stop: 4027    Procedure: MOHS defect repair SCC Left Frontal Scalp (Left: Face) Diagnosis:       Squamous cell carcinoma in situ (SCCIS) of scalp      (Squamous cell carcinoma in situ (SCCIS) of scalp [D04.4])    Surgeons: Maia Ruffin MD Responsible Provider: Fede Martinez MD    Anesthesia Type: MAC ASA Status: 3          Anesthesia Type: No value filed.     Edwin Phase I:      Edwin Phase II: Edwin Score: 10      Anesthesia Post Evaluation    Patient location during evaluation: bedside  Patient participation: complete - patient participated  Level of consciousness: awake  Airway patency: patent  Nausea & Vomiting: no vomiting and no nausea  Complications: no  Cardiovascular status: hemodynamically stable  Respiratory status: acceptable  Hydration status: stable

## 2023-07-14 NOTE — H&P
[facility]  History and Physical Update    Pt Name: Fawn Potter  MRN: [id number]  YOB: 1950  Date of evaluation: 7/14/2023    I have examined the patient and reviewed the H&P/Consult and there are no changes to the patient or plans.       Ronny Grullon MD  Electronically signed 7/14/2023 at 11:23 AM

## 2023-08-18 ENCOUNTER — HOSPITAL ENCOUNTER (OUTPATIENT)
Dept: ULTRASOUND IMAGING | Age: 73
Discharge: HOME | End: 2023-08-18
Payer: MEDICARE

## 2023-08-18 DIAGNOSIS — I71.40 ABDOMINAL AORTIC ANEURYSM (AAA) WITHOUT RUPTURE (HCC): ICD-10-CM

## 2023-08-18 PROCEDURE — 76775 US EXAM ABDO BACK WALL LIM: CPT

## 2023-08-29 ENCOUNTER — OFFICE VISIT (OUTPATIENT)
Dept: CARDIOTHORACIC SURGERY | Age: 73
End: 2023-08-29
Payer: MEDICARE

## 2023-08-29 VITALS
BODY MASS INDEX: 28.92 KG/M2 | OXYGEN SATURATION: 96 % | WEIGHT: 202 LBS | SYSTOLIC BLOOD PRESSURE: 138 MMHG | HEART RATE: 78 BPM | HEIGHT: 70 IN | DIASTOLIC BLOOD PRESSURE: 84 MMHG

## 2023-08-29 DIAGNOSIS — I71.43 INFRARENAL ABDOMINAL AORTIC ANEURYSM (AAA) WITHOUT RUPTURE (HCC): Primary | ICD-10-CM

## 2023-08-29 PROCEDURE — 1123F ACP DISCUSS/DSCN MKR DOCD: CPT | Performed by: PHYSICIAN ASSISTANT

## 2023-08-29 PROCEDURE — G8427 DOCREV CUR MEDS BY ELIG CLIN: HCPCS | Performed by: PHYSICIAN ASSISTANT

## 2023-08-29 PROCEDURE — 1036F TOBACCO NON-USER: CPT | Performed by: PHYSICIAN ASSISTANT

## 2023-08-29 PROCEDURE — 99214 OFFICE O/P EST MOD 30 MIN: CPT | Performed by: PHYSICIAN ASSISTANT

## 2023-08-29 PROCEDURE — G8417 CALC BMI ABV UP PARAM F/U: HCPCS | Performed by: PHYSICIAN ASSISTANT

## 2023-08-29 PROCEDURE — 3017F COLORECTAL CA SCREEN DOC REV: CPT | Performed by: PHYSICIAN ASSISTANT

## 2023-08-29 NOTE — PROGRESS NOTES
CT/CV Surgery Follow Up Office Visit      Patient's Name/Date of Birth: Carmelo Neri / 1950 (67 y.o.)    CC:   Chief Complaint   Patient presents with    Follow-up     AAA    Results     Abd  us        PCP: Henrik Richardson MD    Date: August 29, 2023    We had the pleasure of seeing Carmelo Neri in the office today, as you know this is a very pleasant 67y.o. year old male with a history of AAA that has been followed the past several years. U/S abdominal aorta:8/18/23  PROCEDURE: US ABDOMINAL AORTA LIMITED     CLINICAL INFORMATION: Abdominal aortic aneurysm (AAA) without rupture (HCC)     COMPARISON: No prior study. TECHNIQUE: Multiple grayscale and color flow sonographic images of the abdominal aorta and common iliac arteries were obtained. Spectral Doppler waveforms were also generated for each of these vessels. FINDINGS: Small fusiform aneurysm of the distal abdominal aorta, 3.6 cm. No dissection. No para-aortic mass lesion or fluid collection is seen. .      Proximal Abdominal Aorta  Trans - 2.8 cm  AP - 2.7 cm     Mid Abdominal Aorta  Trans - 2.5 cm  AP - 2.5 cm     Distal Abdominal Aorta  Trans - 3.3 cm  AP - 3.6 cm     Right Common Iliac Artery  Trans = 1.3 cm  AP = 1.5 cm     Left Common Iliac Artery  Trans - 1.2 cm  AP - 1.3 cm     IMPRESSION:  Small aneurysm distal abdominal aorta. **This report has been created using voice recognition software. It may contain minor errors which are inherent in voice recognition technology. **     Final report electronically signed by Dr. Radu Maria on 8/18/2023 1:11 PM    CT abd/pelvis with contrast: 8/15/22  Patient Name: Carmelo Neri MR #: [id number]   YOB: 1950 Gender: M   Service Type: ER Pt. Type: E   Ordering Phys: Zeus Aponte DO Account #: [de-identified]   Admitting Phys: Accession #: [id number]   Family Phys:    Additional CC Phys:     EXAM: CT ABDOMEN and PELVIS W/ IV CONTRAST     Exam Date: 08/15/2022

## 2023-08-29 NOTE — PATIENT INSTRUCTIONS
You may receive a survey regarding the care you received during your visit. Your input is valuable to us. We encourage you to complete and return your survey. We hope you will choose us in the future for your healthcare needs. Thank you for choosing [facility]!   Your Medical Assistant Today:  Karma HARDING   Your Provider for Today: Aisha Correa PA-C  Ph. [phone] opt 1 Unknown

## 2023-11-30 ENCOUNTER — OFFICE VISIT (OUTPATIENT)
Dept: CARDIOLOGY CLINIC | Age: 73
End: 2023-11-30
Payer: MEDICARE

## 2023-11-30 VITALS
BODY MASS INDEX: 29.35 KG/M2 | HEART RATE: 82 BPM | DIASTOLIC BLOOD PRESSURE: 80 MMHG | SYSTOLIC BLOOD PRESSURE: 150 MMHG | HEIGHT: 70 IN | WEIGHT: 205 LBS

## 2023-11-30 DIAGNOSIS — I10 PRIMARY HYPERTENSION: ICD-10-CM

## 2023-11-30 DIAGNOSIS — I48.0 PAROXYSMAL ATRIAL FIBRILLATION (HCC): Primary | ICD-10-CM

## 2023-11-30 PROCEDURE — G8417 CALC BMI ABV UP PARAM F/U: HCPCS | Performed by: NUCLEAR MEDICINE

## 2023-11-30 PROCEDURE — 1123F ACP DISCUSS/DSCN MKR DOCD: CPT | Performed by: NUCLEAR MEDICINE

## 2023-11-30 PROCEDURE — 99213 OFFICE O/P EST LOW 20 MIN: CPT | Performed by: NUCLEAR MEDICINE

## 2023-11-30 PROCEDURE — 3017F COLORECTAL CA SCREEN DOC REV: CPT | Performed by: NUCLEAR MEDICINE

## 2023-11-30 PROCEDURE — 1036F TOBACCO NON-USER: CPT | Performed by: NUCLEAR MEDICINE

## 2023-11-30 PROCEDURE — 3077F SYST BP >= 140 MM HG: CPT | Performed by: NUCLEAR MEDICINE

## 2023-11-30 PROCEDURE — 3079F DIAST BP 80-89 MM HG: CPT | Performed by: NUCLEAR MEDICINE

## 2023-11-30 PROCEDURE — G8484 FLU IMMUNIZE NO ADMIN: HCPCS | Performed by: NUCLEAR MEDICINE

## 2023-11-30 PROCEDURE — G8428 CUR MEDS NOT DOCUMENT: HCPCS | Performed by: NUCLEAR MEDICINE

## 2023-11-30 RX ORDER — FLECAINIDE ACETATE 100 MG/1
TABLET ORAL
Qty: 135 TABLET | Refills: 3 | Status: SHIPPED | OUTPATIENT
Start: 2023-11-30

## 2023-11-30 NOTE — PROGRESS NOTES
[address] [facility].  [address] [place] [id number]  Dept: [phone]  Dept Fax: [phone]  Loc: [phone]    Visit Date: 11/30/2023    Brady Reich is a 68 y.o. male who presents todayfor:  Chief Complaint   Patient presents with    Follow-up    Atrial Fibrillation    Hypertension   Failed cardioversion twice  Ablation in 2004  He decided want to monitor the HR and go with rate control   Some fatigue   No chest pain   BP is stable  No dizziness  No syncope        HPI:  HPI  Past Medical History:   Diagnosis Date    Abdominal aneurysm ([address])     Dr. Reed Cifuentes    Atrial fibrillation ([address]) 06/08/2023    Cancer ([address])     skin    Diverticulitis     Eosinophilic esophagitis     Dr. Sheyla Covington    GERD (gastroesophageal reflux disease)     Hyperlipidemia     Hypertension     Unspecified sleep apnea     has CPAP      Past Surgical History:   Procedure Laterality Date    ATRIAL ABLATION SURGERY  2000    unsure of physician    CHOLECYSTECTOMY  5-30-12    Dr. Racheal Otto  2010     Dr. Kelley Epperson Left 7/14/2023    MOHS defect repair SCC Left Frontal Scalp performed by Sergey Jackson MD at [facility]  2007    Dr. Ishaan Ann  2010    Dr. Sheyla Covington     Family History   Problem Relation Age of Onset    Dementia Mother     High Blood Pressure Father     Heart Failure Father      Social History     Tobacco Use    Smoking status: Never    Smokeless tobacco: Never   Substance Use Topics    Alcohol use: Yes     Comment: shot of vodka--2 glasses of wine per night until getting sick      Current Outpatient Medications   Medication Sig Dispense Refill    METHYLCELLULOSE OP Apply to eye      atorvastatin (LIPITOR) 20 MG tablet Take 1 tablet by mouth daily      fenofibrate (TRICOR) 54 MG tablet Take 1 tablet by mouth daily      Multiple Vitamins-Minerals (OCUVITE PO) Take by

## 2023-12-27 ENCOUNTER — TELEPHONE (OUTPATIENT)
Age: 73
End: 2023-12-27

## 2024-03-26 ENCOUNTER — OFFICE VISIT (OUTPATIENT)
Dept: CARDIOLOGY CLINIC | Age: 74
End: 2024-03-26
Payer: MEDICARE

## 2024-03-26 VITALS
HEART RATE: 96 BPM | DIASTOLIC BLOOD PRESSURE: 82 MMHG | BODY MASS INDEX: 28.89 KG/M2 | HEIGHT: 70 IN | WEIGHT: 201.8 LBS | SYSTOLIC BLOOD PRESSURE: 136 MMHG

## 2024-03-26 DIAGNOSIS — I48.21 PERMANENT ATRIAL FIBRILLATION (HCC): ICD-10-CM

## 2024-03-26 DIAGNOSIS — I10 PRIMARY HYPERTENSION: Primary | ICD-10-CM

## 2024-03-26 PROCEDURE — 1036F TOBACCO NON-USER: CPT | Performed by: NUCLEAR MEDICINE

## 2024-03-26 PROCEDURE — G8427 DOCREV CUR MEDS BY ELIG CLIN: HCPCS | Performed by: NUCLEAR MEDICINE

## 2024-03-26 PROCEDURE — G8484 FLU IMMUNIZE NO ADMIN: HCPCS | Performed by: NUCLEAR MEDICINE

## 2024-03-26 PROCEDURE — 3075F SYST BP GE 130 - 139MM HG: CPT | Performed by: NUCLEAR MEDICINE

## 2024-03-26 PROCEDURE — 99213 OFFICE O/P EST LOW 20 MIN: CPT | Performed by: NUCLEAR MEDICINE

## 2024-03-26 PROCEDURE — 3017F COLORECTAL CA SCREEN DOC REV: CPT | Performed by: NUCLEAR MEDICINE

## 2024-03-26 PROCEDURE — 3079F DIAST BP 80-89 MM HG: CPT | Performed by: NUCLEAR MEDICINE

## 2024-03-26 PROCEDURE — 1123F ACP DISCUSS/DSCN MKR DOCD: CPT | Performed by: NUCLEAR MEDICINE

## 2024-03-26 PROCEDURE — G8417 CALC BMI ABV UP PARAM F/U: HCPCS | Performed by: NUCLEAR MEDICINE

## 2024-03-26 RX ORDER — FLECAINIDE ACETATE 100 MG/1
TABLET ORAL
Qty: 135 TABLET | Refills: 3 | Status: CANCELLED | OUTPATIENT
Start: 2024-03-26

## 2024-03-26 NOTE — PROGRESS NOTES
[facility] [place] [facility]  [facility] - [facility] CARDIOLOGY  [address] [place] [address]  Dept: [phone]  Dept Fax: [phone]  Loc: [phone]    Visit Date: 3/26/2024    Zach Cornell is a 73 y.o. male who presents todayfor:  Chief Complaint   Patient presents with    Check-Up    Hypertension    Hyperlipidemia    Atrial Fibrillation   Failed A fib cardioversion   Ablation 2004 in [place]   HR is borderline higher  Seeing [facility] for possible ablation   Some dyspnea  Exertional   No chest pain  No dizziness  No syncope        HPI:  HPI  Past Medical History:   Diagnosis Date    Abdominal aneurysm (HCC)     Dr. Fernandez    Atrial fibrillation (HCC) 06/08/2023    Cancer (HCC)     skin    Diverticulitis     Eosinophilic esophagitis     Dr. Gatica    GERD (gastroesophageal reflux disease)     Hyperlipidemia     Hypertension     Unspecified sleep apnea     has CPAP      Past Surgical History:   Procedure Laterality Date    ATRIAL ABLATION SURGERY  2000    unsure of physician    CHOLECYSTECTOMY  5-30-12    Dr. Renae     COLONOSCOPY  2010     Dr. Gatica     MOHS SURGERY Left 7/14/2023    MOHS defect repair SCC Left Frontal Scalp performed by Harish Rose MD at [facility]    SKIN CANCER EXCISION  2007    Dr. Rose     UPPER GASTROINTESTINAL ENDOSCOPY  2010    Dr. Gatica     Family History   Problem Relation Age of Onset    Dementia Mother     High Blood Pressure Father     Heart Failure Father      Social History     Tobacco Use    Smoking status: Never    Smokeless tobacco: Never   Substance Use Topics    Alcohol use: Yes     Comment: shot of vodka--2 glasses of wine per night until getting sick      Current Outpatient Medications   Medication Sig Dispense Refill    rivaroxaban (XARELTO) 20 MG TABS tablet TAKE 1 TABLET EVERY 24     HOURS 90 tablet 3    flecainide (TAMBOCOR) 100 MG tablet TAKE 1 TABLET EVERY MORNINGAND 1/2 TABLET EVERY       EVENING (THIS IS THE

## 2024-05-02 RX ORDER — FLECAINIDE ACETATE 100 MG/1
TABLET ORAL
Qty: 135 TABLET | Refills: 3 | OUTPATIENT
Start: 2024-05-02

## 2024-08-26 ENCOUNTER — HOSPITAL ENCOUNTER (OUTPATIENT)
Dept: ULTRASOUND IMAGING | Age: 74
Discharge: HOME | End: 2024-08-26
Payer: MEDICARE

## 2024-08-26 DIAGNOSIS — I71.43 INFRARENAL ABDOMINAL AORTIC ANEURYSM (AAA) WITHOUT RUPTURE (HCC): ICD-10-CM

## 2024-08-26 PROCEDURE — 76775 US EXAM ABDO BACK WALL LIM: CPT

## 2024-09-11 ENCOUNTER — OFFICE VISIT (OUTPATIENT)
Age: 74
End: 2024-09-11
Payer: MEDICARE

## 2024-09-11 VITALS
SYSTOLIC BLOOD PRESSURE: 123 MMHG | HEART RATE: 83 BPM | HEIGHT: 70 IN | BODY MASS INDEX: 28.6 KG/M2 | DIASTOLIC BLOOD PRESSURE: 81 MMHG | WEIGHT: 199.8 LBS

## 2024-09-11 DIAGNOSIS — I71.43 INFRARENAL ABDOMINAL AORTIC ANEURYSM (AAA) WITHOUT RUPTURE (HCC): Primary | ICD-10-CM

## 2024-09-11 PROCEDURE — G8417 CALC BMI ABV UP PARAM F/U: HCPCS | Performed by: PHYSICIAN ASSISTANT

## 2024-09-11 PROCEDURE — 1123F ACP DISCUSS/DSCN MKR DOCD: CPT | Performed by: PHYSICIAN ASSISTANT

## 2024-09-11 PROCEDURE — 1036F TOBACCO NON-USER: CPT | Performed by: PHYSICIAN ASSISTANT

## 2024-09-11 PROCEDURE — G8427 DOCREV CUR MEDS BY ELIG CLIN: HCPCS | Performed by: PHYSICIAN ASSISTANT

## 2024-09-11 PROCEDURE — 3017F COLORECTAL CA SCREEN DOC REV: CPT | Performed by: PHYSICIAN ASSISTANT

## 2024-09-11 PROCEDURE — 99214 OFFICE O/P EST MOD 30 MIN: CPT | Performed by: PHYSICIAN ASSISTANT

## 2024-09-11 RX ORDER — VALSARTAN AND HYDROCHLOROTHIAZIDE 160; 25 MG/1; MG/1
1 TABLET ORAL DAILY
COMMUNITY
Start: 2024-07-17

## 2024-09-11 RX ORDER — TADALAFIL 5 MG/1
5 TABLET ORAL DAILY
COMMUNITY
Start: 2024-06-20

## 2024-09-11 RX ORDER — MULTIVITAMIN WITH IRON
50 TABLET ORAL DAILY
COMMUNITY

## 2024-10-17 RX ORDER — SODIUM CHLORIDE 9 MG/ML
INJECTION, SOLUTION INTRAVENOUS CONTINUOUS
Status: CANCELLED | OUTPATIENT
Start: 2024-10-29

## 2024-10-29 ENCOUNTER — HOSPITAL ENCOUNTER (OUTPATIENT)
Age: 74
Discharge: HOME | End: 2024-10-31
Attending: INTERNAL MEDICINE
Payer: MEDICARE

## 2024-10-29 ENCOUNTER — ANESTHESIA EVENT (OUTPATIENT)
Age: 74
End: 2024-10-29
Payer: MEDICARE

## 2024-10-29 ENCOUNTER — ANESTHESIA (OUTPATIENT)
Age: 74
End: 2024-10-29
Payer: MEDICARE

## 2024-10-29 ENCOUNTER — HOSPITAL ENCOUNTER (OUTPATIENT)
Age: 74
End: 2024-10-29
Attending: INTERNAL MEDICINE
Payer: MEDICARE

## 2024-10-29 VITALS
WEIGHT: 197.31 LBS | BODY MASS INDEX: 28.25 KG/M2 | OXYGEN SATURATION: 99 % | SYSTOLIC BLOOD PRESSURE: 152 MMHG | DIASTOLIC BLOOD PRESSURE: 70 MMHG | HEART RATE: 48 BPM | TEMPERATURE: 98 F | RESPIRATION RATE: 17 BRPM | HEIGHT: 70 IN

## 2024-10-29 DIAGNOSIS — I48.91 ATRIAL FIBRILLATION (HCC): Primary | ICD-10-CM

## 2024-10-29 LAB
ANION GAP SERPL CALC-SCNC: 10 MEQ/L (ref 8–16)
BUN SERPL-MCNC: 20 MG/DL (ref 7–22)
CALCIUM SERPL-MCNC: 8.7 MG/DL (ref 8.5–10.5)
CHLORIDE SERPL-SCNC: 107 MEQ/L (ref 98–111)
CO2 SERPL-SCNC: 24 MEQ/L (ref 23–33)
CREAT SERPL-MCNC: 1 MG/DL (ref 0.4–1.2)
DEPRECATED RDW RBC AUTO: 49 FL (ref 35–45)
ECHO BSA: 2.1 M2
EKG ATRIAL RATE: 58 BPM
EKG P AXIS: 48 DEGREES
EKG P-R INTERVAL: 270 MS
EKG Q-T INTERVAL: 452 MS
EKG Q-T INTERVAL: 474 MS
EKG QRS DURATION: 94 MS
EKG QRS DURATION: 96 MS
EKG QTC CALCULATION (BAZETT): 465 MS
EKG QTC CALCULATION (BAZETT): 470 MS
EKG R AXIS: 59 DEGREES
EKG R AXIS: 69 DEGREES
EKG T AXIS: 49 DEGREES
EKG T AXIS: 56 DEGREES
EKG VENTRICULAR RATE: 58 BPM
EKG VENTRICULAR RATE: 65 BPM
ERYTHROCYTE [DISTWIDTH] IN BLOOD BY AUTOMATED COUNT: 14.2 % (ref 11.5–14.5)
GFR SERPL CREATININE-BSD FRML MDRD: 79 ML/MIN/1.73M2
GLUCOSE SERPL-MCNC: 107 MG/DL (ref 70–108)
HCT VFR BLD AUTO: 45 % (ref 42–52)
HGB BLD-MCNC: 15.8 GM/DL (ref 14–18)
INR PPP: 3.43 (ref 0.85–1.13)
MCH RBC QN AUTO: 33.4 PG (ref 26–33)
MCHC RBC AUTO-ENTMCNC: 35.1 GM/DL (ref 32.2–35.5)
MCV RBC AUTO: 95.1 FL (ref 80–94)
PLATELET # BLD AUTO: 148 THOU/MM3 (ref 130–400)
PMV BLD AUTO: 10 FL (ref 9.4–12.4)
POTASSIUM SERPL-SCNC: 3.8 MEQ/L (ref 3.5–5.2)
RBC # BLD AUTO: 4.73 MILL/MM3 (ref 4.7–6.1)
SODIUM SERPL-SCNC: 141 MEQ/L (ref 135–145)
WBC # BLD AUTO: 4.1 THOU/MM3 (ref 4.8–10.8)

## 2024-10-29 PROCEDURE — 7100000011 HC PHASE II RECOVERY - ADDTL 15 MIN

## 2024-10-29 PROCEDURE — 6360000002 HC RX W HCPCS: Performed by: NURSE ANESTHETIST, CERTIFIED REGISTERED

## 2024-10-29 PROCEDURE — 2500000003 HC RX 250 WO HCPCS: Performed by: NURSE ANESTHETIST, CERTIFIED REGISTERED

## 2024-10-29 PROCEDURE — 92960 CARDIOVERSION ELECTRIC EXT: CPT

## 2024-10-29 PROCEDURE — 7100000010 HC PHASE II RECOVERY - FIRST 15 MIN

## 2024-10-29 PROCEDURE — 2580000003 HC RX 258: Performed by: INTERNAL MEDICINE

## 2024-10-29 PROCEDURE — 85027 COMPLETE CBC AUTOMATED: CPT

## 2024-10-29 PROCEDURE — 80048 BASIC METABOLIC PNL TOTAL CA: CPT

## 2024-10-29 PROCEDURE — 3700000000 HC ANESTHESIA ATTENDED CARE

## 2024-10-29 PROCEDURE — 93005 ELECTROCARDIOGRAM TRACING: CPT | Performed by: INTERNAL MEDICINE

## 2024-10-29 PROCEDURE — 85610 PROTHROMBIN TIME: CPT

## 2024-10-29 PROCEDURE — 36415 COLL VENOUS BLD VENIPUNCTURE: CPT

## 2024-10-29 RX ORDER — PROPOFOL 10 MG/ML
INJECTION, EMULSION INTRAVENOUS
Status: DISCONTINUED | OUTPATIENT
Start: 2024-10-29 | End: 2024-10-29 | Stop reason: SDUPTHER

## 2024-10-29 RX ORDER — AMIODARONE HYDROCHLORIDE 200 MG/1
400 TABLET ORAL DAILY
COMMUNITY
End: 2024-10-29 | Stop reason: SDUPTHER

## 2024-10-29 RX ORDER — AMIODARONE HYDROCHLORIDE 100 MG/1
400 TABLET ORAL DAILY
Qty: 30 TABLET | Refills: 4 | Status: SHIPPED | OUTPATIENT
Start: 2024-10-29

## 2024-10-29 RX ORDER — LIDOCAINE HYDROCHLORIDE 20 MG/ML
INJECTION, SOLUTION INFILTRATION; PERINEURAL
Status: DISCONTINUED | OUTPATIENT
Start: 2024-10-29 | End: 2024-10-29 | Stop reason: SDUPTHER

## 2024-10-29 RX ORDER — SODIUM CHLORIDE 9 MG/ML
INJECTION, SOLUTION INTRAVENOUS CONTINUOUS
Status: DISCONTINUED | OUTPATIENT
Start: 2024-10-29 | End: 2024-11-01 | Stop reason: HOSPADM

## 2024-10-29 RX ADMIN — SODIUM CHLORIDE: 9 INJECTION, SOLUTION INTRAVENOUS at 12:37

## 2024-10-29 RX ADMIN — LIDOCAINE HYDROCHLORIDE 100 MG: 20 INJECTION, SOLUTION INFILTRATION; PERINEURAL at 14:27

## 2024-10-29 RX ADMIN — PROPOFOL 80 MG: 10 INJECTION, EMULSION INTRAVENOUS at 14:28

## 2024-10-29 RX ADMIN — PROPOFOL 50 MG: 10 INJECTION, EMULSION INTRAVENOUS at 14:26

## 2024-10-29 NOTE — ANESTHESIA POSTPROCEDURE EVALUATION
Department of Anesthesiology  Postprocedure Note    Patient: Zach Cornell  MRN: [id number]  YOB: 1950  Date of evaluation: 10/29/2024    Procedure Summary       Date: 10/29/24 Room / Location: [facility] Cardiac Cath Lab    Anesthesia Start: 1425 Anesthesia Stop: 1438    Procedure: CARDIOVERSION EXTERNAL Diagnosis: Atrial fibrillation (HCC)    Scheduled Providers: Tremayne Sandoval MD Responsible Provider: Yaron Martin MD    Anesthesia Type: MAC ASA Status: 2            Anesthesia Type: No value filed.    Edwin Phase I: Edwin Score: 10    Edwin Phase II:      Anesthesia Post Evaluation    Patient location during evaluation: bedside  Patient participation: complete - patient participated  Level of consciousness: awake  Airway patency: patent  Nausea & Vomiting: no vomiting and no nausea  Cardiovascular status: hemodynamically stable  Respiratory status: acceptable  Hydration status: stable  Pain management: adequate        No notable events documented.

## 2024-10-29 NOTE — PROGRESS NOTES
1200 Pt admitted to  2E07 ambulatory for Cardioversion.  Pt NPO. Patient accompanied by spouse.   Vital signs obtained.  Assessment and data collection initiated.   Oriented to room.   Policies and procedures for 2E explained   All questions answered with no further questions at this time.   Fall prevention and safety precautions discussed with patient.   data collection and medication review per DAO Owens RN

## 2024-10-29 NOTE — H&P
1 tablet by mouth daily   Yes Tayler Reyes MD   fenofibrate (TRICOR) 54 MG tablet Take 1 tablet by mouth daily   Yes Tayler Reyes MD   Multiple Vitamins-Minerals (OCUVITE PO) Take by mouth daily   Yes Tayler Reyes MD   Acetaminophen (TYLENOL PO) Take by mouth Extra strength at night   Yes Tayler Reyes MD   aspirin 81 MG tablet Take 1 tablet by mouth daily   Yes Tayler Reyes MD   Calcium Carb-Cholecalciferol (CALCIUM 1000 + D PO) Take by mouth daily   Yes Tayler Reyes MD   atenolol (TENORMIN) 50 MG tablet Take by mouth 1 tab in am and 1/2 tab in pm   Yes Tayler Reyes MD   guaiFENesin (MUCINEX) 600 MG SR tablet Take 1 tablet by mouth every other day   Yes Tayler Reyes MD   montelukast (SINGULAIR) 10 MG tablet Take 1 tablet by mouth daily   Yes Tayler Reyes MD   therapeutic multivitamin-minerals (THERAGRAN-M) tablet Take 1 tablet by mouth daily Centrum   Yes Tayler Reyes MD   omeprazole (PRILOSEC) 20 MG capsule Take 1 capsule by mouth every other day   Yes Tayler Reyes MD     Additional information:       VITAL SIGNS   Vitals:    10/29/24 1224   BP: (!) 153/109   Pulse:    Resp:    Temp:    SpO2:        PHYSICAL:   General: No acute distress  HEENT:  Unremarkable for age  Neck: without increased JVD, carotid pulses 2+ bilaterally without bruits  Heart: RRR, S1 & S2 WNL, S4 gallop, without murmurs or rubs    Lungs: Clear to auscultation    Abdomen: BS present, without HSM, masses, or tenderness    Extremities: without C,C,E.  Pulses 2+ bilaterally  Mental Status: Alert & Oriented        PLANNED PROCEDURE   [x]DCCV      SEDATION  Planned agent:[]Midazolam []Meperidine []Sublimaze []Morphine    ASA Classification:  []1 []2 [x]3 []4 []5  Class 1: A normal healthy patient  Class 2: Pt with mild to moderate systemic disease  Class 3: Severe systemic disease or disturbance  Class 4: Severe systemic disorders that are already

## 2024-10-29 NOTE — PROCEDURES
[facility]  Sedation/Analgesia Post Sedation Record    Pt Name: Zach Cornell  Account number: [id number]  MRN: [id number]  YOB: 1950  Procedure Performed By: Tremayne Sandoval MD MD FACC  Primary Care Physician: Parker Fraser MD  Date: 10/29/2024    POST-PROCEDURE    Physicians/Assistants: Tremayne Sandoval MD MD FACC  Procedure Performed:     Successful DCCV cardioversion      Sedation/Anesthesia: Versed/ Fentanyl and 2% xylocaine local anesthesia.    Estimated Blood Loss: < 10 ml.   Specimens Removed: None       Disposition of Specimen: N/A      Complications: None Immediate      Post-procedure Diagnosis/Findings:     Persistent atrial fibrillation  S/p AF ablation  HYN             Recommendations:   Continue amiodarone and Xarelto   Follow up with Dr. Fajardo for possible AF ablation ravi Sandoval MD MD FACC  Electronically signed 10/29/2024 at 3:09 PM

## 2024-10-29 NOTE — PROCEDURES
[address] [address]                             PROCEDURE NOTE      PATIENT NAME: BRAULIO MCDONALD              : 1950  MED REC NO: [id number]                       ROOM:   ACCOUNT NO: [id number]                       ADMIT DATE: 10/29/2024  PROVIDER: Isabella Cannon MD      DATE OF PROCEDURE:  10/29/2024    SURGEON:  Isabella Cannon MD    PREOPERATIVE DIAGNOSES:    1. Persistent atrial fibrillation.  2. Obstructive sleep apnea.  3. Status post atrial fibrillation ablation.  4. Hypertension.    POSTOPERATIVE DIAGNOSES:    1. Persistent atrial fibrillation.  2. Obstructive sleep apnea.  3. Status post atrial fibrillation ablation.  4. Hypertension.    PROCEDURE:  Direct current cardioversion under conscious sedation care with anesthesia.    RECOMMENDATIONS:  Continue amiodarone and Xarelto, and follow up with Dr. Fajardo.    ASA SCORE:  III.    MALLAMPATI SCORE:  III.    ESTIMATED BLOOD LOSS:  Less than 10 mL.    SPECIMEN TAKEN:  None.    IV FLUIDS:  Normal saline.    BRIEF HISTORY:  The patient is a 74-year-old gentleman with a history of atrial fibrillation, status post atrial fibrillation ablation 10 years ago.  I was reconsulted for atrial fibrillation requiring amiodarone initiation and cardioversion.  The patient also has sleep apnea as well as hypertension.  The patient would benefit from cardioversion.  Procedure advantages and disadvantages and possible complications were explained to the patient.  Signed consent form obtained.    TECHNIQUE:  The patient underwent successful synchronized DC cardioversion using 200 joules with restoration of sinus rhythm.    Thank you for allowing us to participate in the management of the patient.  If you have any questions or concerns, please feel free to contact us.          ISABELLA CANNON MD      D:  10/29/2024 15:14:58     T:  10/29/2024 17:44:24     /TITUS  Job #:  [id number]     Doc#:

## 2024-10-29 NOTE — FLOWSHEET NOTE
10/29/24 5548   AVS Reviewed   AVS & discharge instructions reviewed with patient and/or representative? Yes   Reviewed instructions with Patient;Other (name and relationship in comment)  (spouse, Emy)   Level of Understanding Questions answered;Teach back completed;Verbalized understanding       
Care resumed post cardioversion   
Discharged per wc per transport team with personal belongings   Has ambulated   Remains NSR   Denies complaints   
Dr Sandoval on unit, home / discharge  medications reviewed with Dr Sandoval    
VASCULAR NEUROLOGY ATTENDING  The patient is seen and examined the history and imaging are reviewed. I agree with the resident note unless otherwise noted. Repeat CT head and goals of care discussion to guide further management.

## 2024-10-29 NOTE — ANESTHESIA PRE PROCEDURE
Department of Anesthesiology  Preprocedure Note       Name:  Zach Cornell   Age:  74 y.o.  :  1950                                          MRN:  [id number]         Date:  10/29/2024      Surgeon: * No surgeons listed *    Procedure: * No procedures listed *    Medications prior to admission:   Prior to Admission medications    Medication Sig Start Date End Date Taking? Authorizing Provider   valsartan-hydroCHLOROthiazide (DIOVAN-HCT) 160-25 MG per tablet Take 1 tablet by mouth daily 24  Yes ProviderTayler MD   tadalafil (CIALIS) 5 MG tablet Take 1 tablet by mouth daily 24  Yes Provider, MD Tayler   vitamin B-12 (CYANOCOBALAMIN) 50 MCG tablet Take 1 tablet by mouth daily   Yes ProviderTayler MD   rivaroxaban (XARELTO) 20 MG TABS tablet TAKE 1 TABLET EVERY 24     HOURS 3/26/24  Yes Pelon Su MD   atorvastatin (LIPITOR) 20 MG tablet Take 1 tablet by mouth daily   Yes ProviderTayler MD   fenofibrate (TRICOR) 54 MG tablet Take 1 tablet by mouth daily   Yes Provider, MD Tayler   Multiple Vitamins-Minerals (OCUVITE PO) Take by mouth daily   Yes Provider, MD Tayler   Acetaminophen (TYLENOL PO) Take by mouth Extra strength at night   Yes Provider, MD Tayler   aspirin 81 MG tablet Take 1 tablet by mouth daily   Yes Provider, MD Tayler   Calcium Carb-Cholecalciferol (CALCIUM 1000 + D PO) Take by mouth daily   Yes Provider, MD Tayler   atenolol (TENORMIN) 50 MG tablet Take by mouth 1 tab in am and 1/2 tab in pm   Yes Provider, MD Tayler   guaiFENesin (MUCINEX) 600 MG SR tablet Take 1 tablet by mouth every other day   Yes Provider, MD Tayler   montelukast (SINGULAIR) 10 MG tablet Take 1 tablet by mouth daily   Yes ProviderTayler MD   therapeutic multivitamin-minerals (THERAGRAN-M) tablet Take 1 tablet by mouth daily Centrum   Yes Provider, MD Tayler   omeprazole (PRILOSEC) 20 MG capsule Take 1 capsule by mouth every other

## 2024-10-30 ENCOUNTER — TELEPHONE (OUTPATIENT)
Dept: CARDIOLOGY CLINIC | Age: 74
End: 2024-10-30

## 2024-10-30 NOTE — TELEPHONE ENCOUNTER
[facility] [place] [facility] Amiodarone [facility].   I called her back and advised her to call Dr. Sandoval's office at [facility] for clarification.

## 2024-11-22 ENCOUNTER — ANESTHESIA (OUTPATIENT)
Dept: OPERATING ROOM | Age: 74
End: 2024-11-22
Payer: MEDICARE

## 2024-11-22 ENCOUNTER — HOSPITAL ENCOUNTER (OUTPATIENT)
Age: 74
Setting detail: OUTPATIENT SURGERY
Discharge: HOME | End: 2024-11-22
Attending: SPECIALIST | Admitting: SPECIALIST
Payer: MEDICARE

## 2024-11-22 ENCOUNTER — ANESTHESIA EVENT (OUTPATIENT)
Dept: OPERATING ROOM | Age: 74
End: 2024-11-22
Payer: MEDICARE

## 2024-11-22 VITALS
SYSTOLIC BLOOD PRESSURE: 164 MMHG | HEIGHT: 70 IN | BODY MASS INDEX: 27.63 KG/M2 | TEMPERATURE: 96.9 F | OXYGEN SATURATION: 96 % | WEIGHT: 193 LBS | DIASTOLIC BLOOD PRESSURE: 74 MMHG | HEART RATE: 71 BPM | RESPIRATION RATE: 16 BRPM

## 2024-11-22 DIAGNOSIS — C44.42 SQUAMOUS CELL CARCINOMA OF SCALP: Primary | ICD-10-CM

## 2024-11-22 PROCEDURE — 3600000002 HC SURGERY LEVEL 2 BASE: Performed by: SPECIALIST

## 2024-11-22 PROCEDURE — 7100000011 HC PHASE II RECOVERY - ADDTL 15 MIN: Performed by: SPECIALIST

## 2024-11-22 PROCEDURE — 2580000003 HC RX 258: Performed by: SPECIALIST

## 2024-11-22 PROCEDURE — 2709999900 HC NON-CHARGEABLE SUPPLY: Performed by: SPECIALIST

## 2024-11-22 PROCEDURE — 3700000000 HC ANESTHESIA ATTENDED CARE: Performed by: SPECIALIST

## 2024-11-22 PROCEDURE — 6360000002 HC RX W HCPCS: Performed by: SPECIALIST

## 2024-11-22 PROCEDURE — 2580000003 HC RX 258: Performed by: NURSE ANESTHETIST, CERTIFIED REGISTERED

## 2024-11-22 PROCEDURE — 7100000010 HC PHASE II RECOVERY - FIRST 15 MIN: Performed by: SPECIALIST

## 2024-11-22 PROCEDURE — 3600000012 HC SURGERY LEVEL 2 ADDTL 15MIN: Performed by: SPECIALIST

## 2024-11-22 PROCEDURE — 6360000002 HC RX W HCPCS: Performed by: NURSE ANESTHETIST, CERTIFIED REGISTERED

## 2024-11-22 PROCEDURE — 3700000001 HC ADD 15 MINUTES (ANESTHESIA): Performed by: SPECIALIST

## 2024-11-22 RX ORDER — PROPOFOL 10 MG/ML
INJECTION, EMULSION INTRAVENOUS
Status: DISCONTINUED | OUTPATIENT
Start: 2024-11-22 | End: 2024-11-22 | Stop reason: SDUPTHER

## 2024-11-22 RX ORDER — SODIUM CHLORIDE 9 MG/ML
INJECTION, SOLUTION INTRAVENOUS
Status: DISCONTINUED | OUTPATIENT
Start: 2024-11-22 | End: 2024-11-22 | Stop reason: SDUPTHER

## 2024-11-22 RX ORDER — LIDOCAINE HYDROCHLORIDE AND EPINEPHRINE 10; 10 MG/ML; UG/ML
INJECTION, SOLUTION INFILTRATION; PERINEURAL PRN
Status: DISCONTINUED | OUTPATIENT
Start: 2024-11-22 | End: 2024-11-22 | Stop reason: ALTCHOICE

## 2024-11-22 RX ORDER — LIDOCAINE HYDROCHLORIDE 20 MG/ML
INJECTION, SOLUTION INTRAVENOUS
Status: DISCONTINUED | OUTPATIENT
Start: 2024-11-22 | End: 2024-11-22 | Stop reason: SDUPTHER

## 2024-11-22 RX ORDER — TRAMADOL HYDROCHLORIDE 50 MG/1
50 TABLET ORAL EVERY 6 HOURS PRN
Qty: 12 TABLET | Refills: 0 | Status: SHIPPED | OUTPATIENT
Start: 2024-11-22 | End: 2024-11-25

## 2024-11-22 RX ADMIN — PROPOFOL 50 MG: 10 INJECTION, EMULSION INTRAVENOUS at 12:12

## 2024-11-22 RX ADMIN — WATER 2000 MG: 1 INJECTION INTRAMUSCULAR; INTRAVENOUS; SUBCUTANEOUS at 12:11

## 2024-11-22 RX ADMIN — LIDOCAINE HYDROCHLORIDE 50 MG: 20 INJECTION, SOLUTION INTRAVENOUS at 12:12

## 2024-11-22 RX ADMIN — SODIUM CHLORIDE: 9 INJECTION, SOLUTION INTRAVENOUS at 12:10

## 2024-11-22 RX ADMIN — PROPOFOL 50 MCG/KG/MIN: 10 INJECTION, EMULSION INTRAVENOUS at 12:13

## 2024-11-22 ASSESSMENT — PAIN - FUNCTIONAL ASSESSMENT
PAIN_FUNCTIONAL_ASSESSMENT: 0-10
PAIN_FUNCTIONAL_ASSESSMENT: 0-10
PAIN_FUNCTIONAL_ASSESSMENT: NONE - DENIES PAIN

## 2024-11-22 ASSESSMENT — PAIN DESCRIPTION - DESCRIPTORS: DESCRIPTORS: ACHING

## 2024-11-22 NOTE — ANESTHESIA POSTPROCEDURE EVALUATION
Department of Anesthesiology  Postprocedure Note    Patient: Zach Cornell  MRN: [id number]  YOB: 1950  Date of evaluation: 11/22/2024    Procedure Summary       Date: 11/22/24 Room / Location: [address]    Anesthesia Start: 1210 Anesthesia Stop: 1258    Procedure: MOHS Defect Repair SCC Left Superior Scalp (Left: Face) Diagnosis:       Squamous cell carcinoma of scalp      (Squamous cell carcinoma of scalp [C44.42])    Surgeons: Harish Rose MD Responsible Provider: Clement Saleem MD    Anesthesia Type: MAC ASA Status: 3            Anesthesia Type: MAC    Edwin Phase I:      Edwin Phase II:      Anesthesia Post Evaluation    Patient location during evaluation: bedside  Patient participation: complete - patient participated  Level of consciousness: awake and alert  Pain score: 0  Airway patency: patent  Nausea & Vomiting: no vomiting and no nausea  Cardiovascular status: hemodynamically stable  Respiratory status: acceptable  Hydration status: stable  Pain management: adequate        No notable events documented.

## 2024-11-22 NOTE — PROGRESS NOTES
Dressing=    Head- bacitracin, xeroform, gauze,kerix, ace  Chest - bacitracin, xeroform, gauze, abd

## 2024-11-22 NOTE — PROGRESS NOTES
1301 - Patient arrived to Phase II via chair, report from Sandy LEWIS. Patient awake and alert without complaints. VSS. Dressing intact on head and left upper chest. Drink provided, wife in room. Call light in reach.  1321- IV removed, patient dressed.  1350- Discharge instructions reviewed with patient and wife, Jaclyn in room.  1401 - Patient discharged ambulatory to private vehicle with wife.

## 2024-11-22 NOTE — H&P
[facility]  History and Physical Update    Pt Name: Zach Cornell  MRN: [id number]  YOB: 1950  Date of evaluation: 11/22/2024    I have examined the patient and reviewed the H&P/Consult and there are no changes to the patient or plans.      Harish Rose MD  Electronically signed 11/22/2024 at 10:55 AM

## 2024-11-22 NOTE — ANESTHESIA PRE PROCEDURE
Answered      Vital Signs (Current):   Vitals:    11/22/24 1034   BP: (!) 161/89   Pulse: 58   Resp: 16   Temp: (!) 96.2 °F (35.7 °C)   TempSrc: Temporal   SpO2: 97%   Weight: 87.5 kg (193 lb)   Height: 1.778 m (5' 10\")                                              BP Readings from Last 3 Encounters:   11/22/24 (!) 161/89   10/29/24 (!) 152/70   09/11/24 123/81       NPO Status: Time of last liquid consumption: 2100                        Time of last solid consumption: 2100                        Date of last liquid consumption: 11/21/24                        Date of last solid food consumption: 11/21/24    BMI:   Wt Readings from Last 3 Encounters:   11/22/24 87.5 kg (193 lb)   10/29/24 89.5 kg (197 lb 5 oz)   09/11/24 90.6 kg (199 lb 12.8 oz)     Body mass index is 27.69 kg/m².    CBC:   Lab Results   Component Value Date/Time    WBC 4.1 10/29/2024 12:30 PM    RBC 4.73 10/29/2024 12:30 PM    RBC 4.68 06/06/2012 07:26 AM    HGB 15.8 10/29/2024 12:30 PM    HGB 14.6 06/06/2012 07:26 AM    HCT 45.0 10/29/2024 12:30 PM    MCV 95.1 10/29/2024 12:30 PM    RDW 14.7 09/05/2012 09:02 AM     10/29/2024 12:30 PM       CMP:   Lab Results   Component Value Date/Time     10/29/2024 12:30 PM    K 3.8 10/29/2024 12:30 PM     10/29/2024 12:30 PM    CO2 24 10/29/2024 12:30 PM    BUN 20 10/29/2024 12:30 PM    CREATININE 1.0 10/29/2024 12:30 PM    LABGLOM 79 10/29/2024 12:30 PM    LABGLOM >60 01/17/2023 12:11 PM    GLUCOSE 107 10/29/2024 12:30 PM    GLUCOSE 118 06/06/2012 07:26 AM    CALCIUM 8.7 10/29/2024 12:30 PM    BILITOT 1.2 08/07/2020 02:40 PM    ALKPHOS 40 08/07/2020 02:40 PM    AST 29 08/07/2020 02:40 PM    ALT 31 08/07/2020 02:40 PM       POC Tests: No results for input(s): \"POCGLU\", \"POCNA\", \"POCK\", \"POCCL\", \"POCBUN\", \"POCHEMO\", \"POCHCT\" in the last 72 hours.    Coags:   Lab Results   Component Value Date/Time    INR 3.43 10/29/2024 12:30 PM    APTT 36.4 08/10/2020 11:20 AM       HCG (If Applicable): No

## 2024-11-22 NOTE — OP NOTE
Operative Note    Patient name: Zach Cornell             Medical Record Number: [id number]    Primary Care Physician: Parker Fraser MD     1950    Date of Procedure: 2024    Pre-operative Diagnosis: 5cm2 defect of left superior scalp s/p MOHS for squamous cell carcinoma    Post-operative Diagnosis: Same    Procedure Performed: Full thickness skin graft (5 cm2) repair of left superior scalp defect    Surgeons/Assistants: Dr. Harish Rose MD /Josette Blair PA-C    Estimated Blood Loss: 5ml     Complications: none immediately appreciated    Procedure:    With the patient lying in the supine position and under adequate anesthesia per the anesthesia team.   Local anesthesia consisting of 11 ml of 1% Lidocaine 1:100,000 with epinephrine solution of the donor site of the left infraclavicular area as well as the left superior scalp defect.   The area was prepped and draped in the standard surgical fashion.  The patient has very thin skin and a 5cm2 defect which could not be closed primarily, therefore a full thickness skin graft was taken.  It was defatted and secured in position with a 3-0 silk suture tie and then a 5-0 fast absorbable suture was placed in a simple running fashion.  A Bacitracin Xeroform and moist cotton ball tie over bolster was secured using the tails of the silk sutures.  The donor site was closed with a 3-0 Monocryl suture placed in interrupted buried fashion over Surgicel and then Histoacryl respectively.  The patient tolerated the procedure well and remained hemodynamically stable throughout the procedure and was quite comfortable throughout the operative course.    Clinical staging for cancer cases:  Ct  Cn  Cm    Harish Rose MD  Electronically signed by me on 2024 at 12:30 PMOperative Note      Patient: Zach Cornell  YOB: 1950  MRN: [id number]    Date of Procedure: 2024    Pre-Op Diagnosis Codes:      * Squamous cell carcinoma of scalp

## 2024-11-26 ENCOUNTER — OFFICE VISIT (OUTPATIENT)
Dept: CARDIOLOGY CLINIC | Age: 74
End: 2024-11-26
Payer: MEDICARE

## 2024-11-26 VITALS
SYSTOLIC BLOOD PRESSURE: 150 MMHG | HEART RATE: 76 BPM | WEIGHT: 193 LBS | BODY MASS INDEX: 27.63 KG/M2 | DIASTOLIC BLOOD PRESSURE: 84 MMHG | HEIGHT: 70 IN

## 2024-11-26 DIAGNOSIS — I10 PRIMARY HYPERTENSION: ICD-10-CM

## 2024-11-26 DIAGNOSIS — E78.01 FAMILIAL HYPERCHOLESTEROLEMIA: ICD-10-CM

## 2024-11-26 DIAGNOSIS — I48.0 PAROXYSMAL ATRIAL FIBRILLATION (HCC): Primary | ICD-10-CM

## 2024-11-26 PROCEDURE — 3079F DIAST BP 80-89 MM HG: CPT | Performed by: NUCLEAR MEDICINE

## 2024-11-26 PROCEDURE — 1123F ACP DISCUSS/DSCN MKR DOCD: CPT | Performed by: NUCLEAR MEDICINE

## 2024-11-26 PROCEDURE — G8428 CUR MEDS NOT DOCUMENT: HCPCS | Performed by: NUCLEAR MEDICINE

## 2024-11-26 PROCEDURE — G8417 CALC BMI ABV UP PARAM F/U: HCPCS | Performed by: NUCLEAR MEDICINE

## 2024-11-26 PROCEDURE — 3017F COLORECTAL CA SCREEN DOC REV: CPT | Performed by: NUCLEAR MEDICINE

## 2024-11-26 PROCEDURE — 99214 OFFICE O/P EST MOD 30 MIN: CPT | Performed by: NUCLEAR MEDICINE

## 2024-11-26 PROCEDURE — 1036F TOBACCO NON-USER: CPT | Performed by: NUCLEAR MEDICINE

## 2024-11-26 PROCEDURE — 3077F SYST BP >= 140 MM HG: CPT | Performed by: NUCLEAR MEDICINE

## 2024-11-26 PROCEDURE — G8484 FLU IMMUNIZE NO ADMIN: HCPCS | Performed by: NUCLEAR MEDICINE

## 2024-11-26 RX ORDER — AMIODARONE HYDROCHLORIDE 200 MG/1
200 TABLET ORAL DAILY
COMMUNITY
End: 2024-11-26 | Stop reason: SDUPTHER

## 2024-11-26 RX ORDER — AMIODARONE HYDROCHLORIDE 200 MG/1
200 TABLET ORAL DAILY
Qty: 90 TABLET | Refills: 3 | Status: SHIPPED | OUTPATIENT
Start: 2024-11-26

## 2024-11-26 NOTE — PROGRESS NOTES
[facility] [place] [facility]  [facility] CARDIOLOGY  [address] [place] [address]  Dept: [phone]  Dept Fax: [phone]  Loc: [phone]    Visit Date: 11/26/2024    Zach Cornell is a 74 y.o. male who presents todayfor:  Chief Complaint   Patient presents with    Hypertension    Hyperlipidemia    Atrial Fibrillation     Had cardioversion again   Seen [place]  On amiodarone  ?? Ablation down the line  No chest pain per se  Some dyspnea  Some fatigue   BP is stable  No dizziness  No syncope  On statins for hyperlipidemia  No issues with the meds   No bleeding issues       HPI:  HPI  Past Medical History:   Diagnosis Date    Abdominal aneurysm (HCC)     Dr. Fernandez    Atrial fibrillation (HCC) 06/08/2023    Cancer (HCC)     skin    Diverticulitis     Eosinophilic esophagitis     Dr. Gatica    GERD (gastroesophageal reflux disease)     Hyperlipidemia     Hypertension     Unspecified sleep apnea     has CPAP      Past Surgical History:   Procedure Laterality Date    ATRIAL ABLATION SURGERY  01/01/2000    unsure of physician    CARDIOVERSION  10/29/2024    CHOLECYSTECTOMY  05/30/2012    Dr. Renae     COLONOSCOPY  01/01/2010    Dr. Gatica     FACIAL SURGERY Left 11/22/2024    MOHS Defect Repair SCC Left Superior Scalp performed by Harish Rose MD at [facility] [place] [facility]    MOHS SURGERY Left 07/14/2023    MOHS defect repair SCC Left Frontal Scalp performed by Harish Rose MD at [facility] OR    SKIN CANCER EXCISION  01/01/2007    Dr. Rose     UPPER GASTROINTESTINAL ENDOSCOPY  01/01/2010    Dr. Gatica     Family History   Problem Relation Age of Onset    Dementia Mother     High Blood Pressure Father     Heart Failure Father      Social History     Tobacco Use    Smoking status: Never    Smokeless tobacco: Never   Substance Use Topics    Alcohol use: Yes     Alcohol/week: 15.0 standard drinks of alcohol     Types: 10 Glasses of wine, 5 Shots of liquor

## 2025-07-29 ENCOUNTER — OFFICE VISIT (OUTPATIENT)
Dept: CARDIOLOGY CLINIC | Age: 75
End: 2025-07-29
Payer: MEDICARE

## 2025-07-29 VITALS
SYSTOLIC BLOOD PRESSURE: 137 MMHG | HEART RATE: 81 BPM | HEIGHT: 70 IN | DIASTOLIC BLOOD PRESSURE: 81 MMHG | BODY MASS INDEX: 27.94 KG/M2 | WEIGHT: 195.2 LBS

## 2025-07-29 DIAGNOSIS — I48.0 PAROXYSMAL ATRIAL FIBRILLATION (HCC): ICD-10-CM

## 2025-07-29 DIAGNOSIS — E78.01 FAMILIAL HYPERCHOLESTEROLEMIA: ICD-10-CM

## 2025-07-29 DIAGNOSIS — I10 PRIMARY HYPERTENSION: Primary | ICD-10-CM

## 2025-07-29 PROCEDURE — 3075F SYST BP GE 130 - 139MM HG: CPT | Performed by: NUCLEAR MEDICINE

## 2025-07-29 PROCEDURE — G8417 CALC BMI ABV UP PARAM F/U: HCPCS | Performed by: NUCLEAR MEDICINE

## 2025-07-29 PROCEDURE — 99214 OFFICE O/P EST MOD 30 MIN: CPT | Performed by: NUCLEAR MEDICINE

## 2025-07-29 PROCEDURE — 1123F ACP DISCUSS/DSCN MKR DOCD: CPT | Performed by: NUCLEAR MEDICINE

## 2025-07-29 PROCEDURE — 1036F TOBACCO NON-USER: CPT | Performed by: NUCLEAR MEDICINE

## 2025-07-29 PROCEDURE — G8427 DOCREV CUR MEDS BY ELIG CLIN: HCPCS | Performed by: NUCLEAR MEDICINE

## 2025-07-29 PROCEDURE — 3079F DIAST BP 80-89 MM HG: CPT | Performed by: NUCLEAR MEDICINE

## 2025-07-29 PROCEDURE — 1159F MED LIST DOCD IN RCRD: CPT | Performed by: NUCLEAR MEDICINE

## 2025-07-29 PROCEDURE — 3017F COLORECTAL CA SCREEN DOC REV: CPT | Performed by: NUCLEAR MEDICINE

## 2025-07-29 RX ORDER — VALSARTAN AND HYDROCHLOROTHIAZIDE 320; 25 MG/1; MG/1
1 TABLET, FILM COATED ORAL DAILY
COMMUNITY

## 2025-07-29 NOTE — PROGRESS NOTES
DTaP/Tdap/Td vaccine (2 - Td or Tdap) 05/26/2030    Shingles vaccine  Completed    Hepatitis A vaccine  Aged Out    Hepatitis B vaccine  Aged Out    Hib vaccine  Aged Out    Polio vaccine  Aged Out    Meningococcal (ACWY) vaccine  Aged Out    Meningococcal B vaccine  Aged Out       Subjective:  General:   No fever, no chills, No fatigue or weight loss  Pulmonary:    No dyspnea, no wheezing  Cardiac:    Denies recent chest pain,   GI:     No nausea or vomiting, no abdominal pain  Neuro:    No dizziness or light headedness,   Musculoskeletal:  No recent active issues  Extremities:   No edema, no obvious claudication       Objective:  General:   Well developed, well nourished  Lungs:   Clear to auscultation  Heart:    Normal S1 S2, Slight murmur. no rubs, no gallops  Abdomen:   Soft, non tender, no organomegalies, positive bowel sounds  Extremities:   No edema, no cyanosis, good peripheral pulses  Neurological:   Awake, alert, oriented. No obvious focal deficits  Musculoskelatal:  No obvious deformities   /81   Pulse 81   Ht 1.778 m (5' 10\")   Wt 88.5 kg (195 lb 3.2 oz)   BMI 28.01 kg/m²     Assessment:  Assessment & Plan    Diagnosis Orders   1. Primary hypertension        2. Familial hypercholesterolemia        3. Paroxysmal atrial fibrillation (HCC)        As above  A fib seems stable  Doing well     Plan:  No follow-ups on file.  As above  Monitor for any A fib   Continue risk factor modification and medical management  Thank you for allowing me to participate in the care of your patient. Please don't hesitate to contact me regarding any further issues related to the patient care    Orders Placed:  No orders of the defined types were placed in this encounter.      Prescribed:  No orders of the defined types were placed in this encounter.         Discussed use, benefit, and side effects of prescribed medications. All patient questions answered. Pt voicedunderstanding. Instructed to continue current

## (undated) DEVICE — GLOVE SURG SZ 8 L11.77IN FNGR THK9.8MIL STRW LTX POLYMER

## (undated) DEVICE — SUTURE MONOCRYL SZ 4-0 L18IN ABSRB UD P-3 L13MM 3/8 CIR PRIM Y494G

## (undated) DEVICE — BANDAGE,GAUZE,4.5"X4.1YD,STERILE,LF: Brand: MEDLINE

## (undated) DEVICE — COTTON BALL ST

## (undated) DEVICE — GLOVE ORANGE PI 7   MSG9070

## (undated) DEVICE — Device

## (undated) DEVICE — SUTURE MCRYL SZ 4-0 L18IN ABSRB UD P-3 L13MM 3/8 CIR PRIM Y494G

## (undated) DEVICE — PACK PROCEDURE SURG PLAS SC MIN SRHP LF